# Patient Record
Sex: FEMALE | Race: BLACK OR AFRICAN AMERICAN | NOT HISPANIC OR LATINO | Employment: FULL TIME | URBAN - METROPOLITAN AREA
[De-identification: names, ages, dates, MRNs, and addresses within clinical notes are randomized per-mention and may not be internally consistent; named-entity substitution may affect disease eponyms.]

---

## 2023-03-23 PROBLEM — I65.29 CAROTID ARTERY STENOSIS: Status: ACTIVE | Noted: 2023-03-23

## 2023-03-23 PROBLEM — M25.511 RIGHT SHOULDER PAIN: Status: ACTIVE | Noted: 2023-03-23

## 2023-03-23 PROBLEM — M25.562 LEFT KNEE PAIN: Status: ACTIVE | Noted: 2023-03-23

## 2023-03-23 PROBLEM — L91.8 CUTANEOUS SKIN TAGS: Status: ACTIVE | Noted: 2023-03-23

## 2023-03-23 PROBLEM — K63.5 COLON POLYPS: Status: ACTIVE | Noted: 2023-03-23

## 2023-03-23 PROBLEM — M65.9 TENOSYNOVITIS: Status: ACTIVE | Noted: 2023-03-23

## 2023-03-23 PROBLEM — R06.00 DYSPNEA: Status: ACTIVE | Noted: 2023-03-23

## 2023-03-23 PROBLEM — M85.80 OSTEOPENIA: Status: ACTIVE | Noted: 2023-03-23

## 2023-03-23 PROBLEM — R07.9 CHEST PAIN: Status: ACTIVE | Noted: 2023-03-23

## 2023-03-23 PROBLEM — I10 HYPERTENSION: Status: ACTIVE | Noted: 2023-03-23

## 2023-03-23 PROBLEM — E66.9 OBESITY: Status: ACTIVE | Noted: 2023-03-23

## 2023-03-23 PROBLEM — R73.03 PREDIABETES: Status: ACTIVE | Noted: 2023-03-23

## 2023-03-23 PROBLEM — J44.9 COPD (CHRONIC OBSTRUCTIVE PULMONARY DISEASE) (MULTI): Status: ACTIVE | Noted: 2023-03-23

## 2023-03-23 PROBLEM — I77.1 SUBCLAVIAN ARTERY STENOSIS, LEFT (CMS-HCC): Status: ACTIVE | Noted: 2023-03-23

## 2023-03-23 PROBLEM — E78.5 DYSLIPIDEMIA: Status: ACTIVE | Noted: 2023-03-23

## 2023-03-23 PROBLEM — R05.9 COUGH: Status: ACTIVE | Noted: 2023-03-23

## 2023-03-23 PROBLEM — R19.00 ABDOMINAL MASS: Status: ACTIVE | Noted: 2023-03-23

## 2023-03-23 PROBLEM — R19.02 LEFT UPPER QUADRANT ABDOMINAL SWELLING, MASS AND LUMP: Status: ACTIVE | Noted: 2023-03-23

## 2023-03-23 PROBLEM — T16.9XXA FOREIGN BODY IN EAR: Status: ACTIVE | Noted: 2023-03-23

## 2023-03-23 PROBLEM — M65.90 TENOSYNOVITIS: Status: ACTIVE | Noted: 2023-03-23

## 2023-03-23 PROBLEM — R60.0 LOWER EXTREMITY EDEMA: Status: ACTIVE | Noted: 2023-03-23

## 2023-03-23 PROBLEM — I42.1 CARDIOMYOPATHY, HYPERTROPHIC OBSTRUCTIVE (MULTI): Status: ACTIVE | Noted: 2023-03-23

## 2023-03-23 PROBLEM — M25.522 LEFT ELBOW PAIN: Status: ACTIVE | Noted: 2023-03-23

## 2023-03-23 PROBLEM — G47.33 OBSTRUCTIVE SLEEP APNEA: Status: ACTIVE | Noted: 2023-03-23

## 2023-03-23 PROBLEM — L03.90 CELLULITIS: Status: ACTIVE | Noted: 2023-03-23
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
## 2023-03-23 PROBLEM — J40 BRONCHITIS: Status: ACTIVE | Noted: 2023-03-23

## 2023-03-23 RX ORDER — DICLOFENAC SODIUM 10 MG/G
GEL TOPICAL
COMMUNITY

## 2023-03-23 RX ORDER — PRAVASTATIN SODIUM 80 MG/1
1 TABLET ORAL DAILY
COMMUNITY
Start: 2015-03-25 | End: 2024-02-02 | Stop reason: SDUPTHER

## 2023-03-23 RX ORDER — SPIRONOLACTONE AND HYDROCHLOROTHIAZIDE 25; 25 MG/1; MG/1
1 TABLET ORAL DAILY
COMMUNITY
Start: 2016-07-25 | End: 2024-01-16

## 2023-03-23 RX ORDER — GLUCOSAM/CHONDRO/HERB 149/HYAL 750-100 MG
TABLET ORAL
COMMUNITY

## 2023-03-23 RX ORDER — COD LIVER OIL
1 OIL (ML) ORAL DAILY
COMMUNITY
Start: 2014-02-06

## 2023-03-23 RX ORDER — ALBUTEROL SULFATE 90 UG/1
1-2 AEROSOL, METERED RESPIRATORY (INHALATION) AS NEEDED
COMMUNITY
Start: 2022-08-06

## 2023-03-23 RX ORDER — ASPIRIN 81 MG/1
81 TABLET ORAL DAILY
COMMUNITY
Start: 2020-06-22 | End: 2023-11-13

## 2023-03-23 RX ORDER — CARVEDILOL 25 MG/1
1 TABLET ORAL
COMMUNITY
Start: 2015-07-13 | End: 2023-11-16

## 2023-03-28 ENCOUNTER — OFFICE VISIT (OUTPATIENT)
Dept: PRIMARY CARE | Facility: CLINIC | Age: 63
End: 2023-03-28

## 2023-03-28 VITALS
BODY MASS INDEX: 40.4 KG/M2 | WEIGHT: 200 LBS | RESPIRATION RATE: 17 BRPM | DIASTOLIC BLOOD PRESSURE: 78 MMHG | SYSTOLIC BLOOD PRESSURE: 117 MMHG | TEMPERATURE: 97.7 F | OXYGEN SATURATION: 95 %

## 2023-03-28 DIAGNOSIS — J06.9 UPPER RESPIRATORY TRACT INFECTION, UNSPECIFIED TYPE: Primary | ICD-10-CM

## 2023-03-28 PROCEDURE — 1036F TOBACCO NON-USER: CPT | Performed by: STUDENT IN AN ORGANIZED HEALTH CARE EDUCATION/TRAINING PROGRAM

## 2023-03-28 PROCEDURE — 3074F SYST BP LT 130 MM HG: CPT | Performed by: STUDENT IN AN ORGANIZED HEALTH CARE EDUCATION/TRAINING PROGRAM

## 2023-03-28 PROCEDURE — 99213 OFFICE O/P EST LOW 20 MIN: CPT | Performed by: STUDENT IN AN ORGANIZED HEALTH CARE EDUCATION/TRAINING PROGRAM

## 2023-03-28 PROCEDURE — 3078F DIAST BP <80 MM HG: CPT | Performed by: STUDENT IN AN ORGANIZED HEALTH CARE EDUCATION/TRAINING PROGRAM

## 2023-03-28 RX ORDER — AZITHROMYCIN 250 MG/1
TABLET, FILM COATED ORAL
Qty: 6 TABLET | Refills: 0 | Status: SHIPPED | OUTPATIENT
Start: 2023-03-28 | End: 2023-04-02

## 2023-03-28 RX ORDER — BENZONATATE 200 MG/1
200 CAPSULE ORAL 3 TIMES DAILY PRN
Qty: 21 CAPSULE | Refills: 0 | Status: SHIPPED | OUTPATIENT
Start: 2023-03-28 | End: 2023-04-04

## 2023-03-28 NOTE — PROGRESS NOTES
Subjective   Patient ID: Moni Riggs is a 62 y.o. female who presents for cough/congestion.     Cough/congestion  Yellow mucus  Has been taking Mucinex   Keeping her up at night  No chest pain   Some shortness of breath; does admit to some deconditioning   Granddaughter with cold few weeks ago  No fevers   Slight sinus pain/congestion on L side   Has not used inhaler recently (Albuterol)     Pharmacy: [email or web address]    Review of Systems: as above     Objective   /78 (BP Location: Right arm)   Temp 36.5 °C (97.7 °F)   Resp 17   Wt 90.7 kg (200 lb)   SpO2 95%   BMI 40.40 kg/m²     Physical Exam  General: well appearing AA female in NAD  HEENT: NCAT, MMM, Tms visible with light reflex bilaterally, pharyngeal erythema without exudates, nasal turbinates erythematous   CV: RRR  PULM: no hypoxia or labored respirations, no focal crackles or wheezing, intermittent productive coughing  ABD: obese, soft, NT, ND  EXT: WWP, no significant edema  NEURO: A&Ox4, no gross motor or sensory deficits       Assessment/Plan   Diagnoses and all orders for this visit:  Upper respiratory tract infection, unspecified type  -     azithromycin (Zithromax) 250 mg tablet; Take 2 tablets (500 mg) by mouth once daily for 1 day, THEN 1 tablet (250 mg) once daily for 4 days. Take 2 tabs (500 mg) by mouth today, than 1 daily for 4 days..  -     benzonatate (Tessalon) 200 mg capsule; Take 1 capsule (200 mg) by mouth 3 times a day as needed for cough for up to 7 days. Do not crush or chew.    Return precautions reviewed. If no improvement in next 5 days on above regimen, may consider chest x-ray and additional/alternative antibiotic treatment.     Arturo Lynch MD  Internal Medicine-Pediatrics

## 2023-08-21 ENCOUNTER — OFFICE VISIT (OUTPATIENT)
Dept: PRIMARY CARE | Facility: CLINIC | Age: 63
End: 2023-08-21
Payer: COMMERCIAL

## 2023-08-21 VITALS — SYSTOLIC BLOOD PRESSURE: 120 MMHG | DIASTOLIC BLOOD PRESSURE: 70 MMHG | OXYGEN SATURATION: 94 % | HEART RATE: 80 BPM

## 2023-08-21 DIAGNOSIS — Z00.00 ROUTINE GENERAL MEDICAL EXAMINATION AT A HEALTH CARE FACILITY: ICD-10-CM

## 2023-08-21 DIAGNOSIS — J06.9 UPPER RESPIRATORY TRACT INFECTION, UNSPECIFIED TYPE: Primary | ICD-10-CM

## 2023-08-21 LAB
ALANINE AMINOTRANSFERASE (SGPT) (U/L) IN SER/PLAS: 14 U/L (ref 7–45)
ALBUMIN (G/DL) IN SER/PLAS: 4.3 G/DL (ref 3.4–5)
ALKALINE PHOSPHATASE (U/L) IN SER/PLAS: 92 U/L (ref 33–136)
ANION GAP IN SER/PLAS: 12 MMOL/L (ref 10–20)
ASPARTATE AMINOTRANSFERASE (SGOT) (U/L) IN SER/PLAS: 12 U/L (ref 9–39)
BASOPHILS (10*3/UL) IN BLOOD BY AUTOMATED COUNT: 0.05 X10E9/L (ref 0–0.1)
BASOPHILS/100 LEUKOCYTES IN BLOOD BY AUTOMATED COUNT: 0.5 % (ref 0–2)
BILIRUBIN TOTAL (MG/DL) IN SER/PLAS: 0.3 MG/DL (ref 0–1.2)
CALCIDIOL (25 OH VITAMIN D3) (NG/ML) IN SER/PLAS: 32 NG/ML
CALCIUM (MG/DL) IN SER/PLAS: 10.9 MG/DL (ref 8.6–10.6)
CARBON DIOXIDE, TOTAL (MMOL/L) IN SER/PLAS: 27 MMOL/L (ref 21–32)
CHLORIDE (MMOL/L) IN SER/PLAS: 103 MMOL/L (ref 98–107)
CHOLESTEROL (MG/DL) IN SER/PLAS: 155 MG/DL (ref 0–199)
CHOLESTEROL IN HDL (MG/DL) IN SER/PLAS: 37.9 MG/DL
CHOLESTEROL/HDL RATIO: 4.1
CREATININE (MG/DL) IN SER/PLAS: 1.26 MG/DL (ref 0.5–1.05)
EOSINOPHILS (10*3/UL) IN BLOOD BY AUTOMATED COUNT: 0.33 X10E9/L (ref 0–0.7)
EOSINOPHILS/100 LEUKOCYTES IN BLOOD BY AUTOMATED COUNT: 3.6 % (ref 0–6)
ERYTHROCYTE DISTRIBUTION WIDTH (RATIO) BY AUTOMATED COUNT: 12.8 % (ref 11.5–14.5)
ERYTHROCYTE MEAN CORPUSCULAR HEMOGLOBIN CONCENTRATION (G/DL) BY AUTOMATED: 31.6 G/DL (ref 32–36)
ERYTHROCYTE MEAN CORPUSCULAR VOLUME (FL) BY AUTOMATED COUNT: 89 FL (ref 80–100)
ERYTHROCYTES (10*6/UL) IN BLOOD BY AUTOMATED COUNT: 5.29 X10E12/L (ref 4–5.2)
ESTIMATED AVERAGE GLUCOSE FOR HBA1C: 140 MG/DL
GFR FEMALE: 47 ML/MIN/1.73M2
GLUCOSE (MG/DL) IN SER/PLAS: 121 MG/DL (ref 74–99)
HEMATOCRIT (%) IN BLOOD BY AUTOMATED COUNT: 47.1 % (ref 36–46)
HEMOGLOBIN (G/DL) IN BLOOD: 14.9 G/DL (ref 12–16)
HEMOGLOBIN A1C/HEMOGLOBIN TOTAL IN BLOOD: 6.5 %
IMMATURE GRANULOCYTES/100 LEUKOCYTES IN BLOOD BY AUTOMATED COUNT: 2.9 % (ref 0–0.9)
LDL: 90 MG/DL (ref 0–99)
LEUKOCYTES (10*3/UL) IN BLOOD BY AUTOMATED COUNT: 9.3 X10E9/L (ref 4.4–11.3)
LYMPHOCYTES (10*3/UL) IN BLOOD BY AUTOMATED COUNT: 3.91 X10E9/L (ref 1.2–4.8)
LYMPHOCYTES/100 LEUKOCYTES IN BLOOD BY AUTOMATED COUNT: 42.1 % (ref 13–44)
MONOCYTES (10*3/UL) IN BLOOD BY AUTOMATED COUNT: 0.79 X10E9/L (ref 0.1–1)
MONOCYTES/100 LEUKOCYTES IN BLOOD BY AUTOMATED COUNT: 8.5 % (ref 2–10)
NEUTROPHILS (10*3/UL) IN BLOOD BY AUTOMATED COUNT: 3.94 X10E9/L (ref 1.2–7.7)
NEUTROPHILS/100 LEUKOCYTES IN BLOOD BY AUTOMATED COUNT: 42.4 % (ref 40–80)
NRBC (PER 100 WBCS) BY AUTOMATED COUNT: 0 /100 WBC (ref 0–0)
PLATELETS (10*3/UL) IN BLOOD AUTOMATED COUNT: 359 X10E9/L (ref 150–450)
POTASSIUM (MMOL/L) IN SER/PLAS: 4.3 MMOL/L (ref 3.5–5.3)
PROTEIN TOTAL: 7.1 G/DL (ref 6.4–8.2)
SODIUM (MMOL/L) IN SER/PLAS: 138 MMOL/L (ref 136–145)
THYROTROPIN (MIU/L) IN SER/PLAS BY DETECTION LIMIT <= 0.05 MIU/L: 1.66 MIU/L (ref 0.44–3.98)
TRIGLYCERIDE (MG/DL) IN SER/PLAS: 135 MG/DL (ref 0–149)
UREA NITROGEN (MG/DL) IN SER/PLAS: 25 MG/DL (ref 6–23)
VLDL: 27 MG/DL (ref 0–40)

## 2023-08-21 PROCEDURE — 80061 LIPID PANEL: CPT

## 2023-08-21 PROCEDURE — 85025 COMPLETE CBC W/AUTO DIFF WBC: CPT

## 2023-08-21 PROCEDURE — 3074F SYST BP LT 130 MM HG: CPT | Performed by: INTERNAL MEDICINE

## 2023-08-21 PROCEDURE — 83036 HEMOGLOBIN GLYCOSYLATED A1C: CPT

## 2023-08-21 PROCEDURE — 80053 COMPREHEN METABOLIC PANEL: CPT

## 2023-08-21 PROCEDURE — 1036F TOBACCO NON-USER: CPT | Performed by: INTERNAL MEDICINE

## 2023-08-21 PROCEDURE — 3078F DIAST BP <80 MM HG: CPT | Performed by: INTERNAL MEDICINE

## 2023-08-21 PROCEDURE — 82306 VITAMIN D 25 HYDROXY: CPT

## 2023-08-21 PROCEDURE — 84443 ASSAY THYROID STIM HORMONE: CPT

## 2023-08-21 PROCEDURE — 99213 OFFICE O/P EST LOW 20 MIN: CPT | Performed by: INTERNAL MEDICINE

## 2023-08-21 RX ORDER — AZITHROMYCIN 250 MG/1
TABLET, FILM COATED ORAL
Qty: 6 TABLET | Refills: 0 | Status: SHIPPED | OUTPATIENT
Start: 2023-08-21 | End: 2023-08-26

## 2023-08-21 NOTE — PROGRESS NOTES
"I reviewed with the resident the medical history and the resident’s findings on physical examination.  I discussed with the resident the patient’s diagnosis and concur with the treatment plan as documented in the resident note.           Moni Riggs is a 63 yo F presenting in FU     1. L knee pain     2. R shoulder pain     3. T2DM 6.3 in 6.22    4. HTN, DLD, HOCM, ?HFpEF, LAMBERTO on PAP -  in 4.23  -spiron-hctz  -prava 40   -coreg 25 bid   -asa 81   -FU cardiology   -PAP     #HM   -due labs   -mammo 4.23   -cscope 4.22  -tdap 2014   -?shingrix      59\"   202 lbs in 2.23            Ella Donovan MD      "

## 2023-08-21 NOTE — PROGRESS NOTES
Ms. Riggs is  a 63 YO F here after she was diagnosed with COVID on 8/11 for mild resp sx. She was seen in the ED and was discharged with Paxlovid, which she did not take. She has hx of asthma so was also given albuterol. Was also given prednisone and Mucinex. Medications helped relief symptoms.     Although she feels better, and only has mild phlegm and airway irritation, she is concerned about developing pneumonia.     ROS reviewed and negative. Leg swelling from prior visit is improved.     PE:  Vitals:    08/21/23 0839   BP: 120/70   Pulse: 80   SpO2: 94%      /70   Pulse 80   SpO2 94%        63 y/o F with obesity, HTN, DLD, DM2, HFpEF, carotid stenosis, LAMBERTO on CPAP presenting to clinic for persistent sx after COVID infection.     #COVID  -Mild sx  -Took Prednisone albuterol mucinex  -Did not take paxlovid  -Z-pack        Physical Exam  Constitutional:       General: She is not in acute distress.     Appearance: Normal appearance.   HENT:      Head: Normocephalic and atraumatic.      Right Ear: External ear normal.      Left Ear: External ear normal.      Mouth/Throat:      Mouth: Mucous membranes are moist.      Pharynx: Oropharynx is clear.   Eyes:      Extraocular Movements: Extraocular movements intact.      Conjunctiva/sclera: Conjunctivae normal.      Pupils: Pupils are equal, round, and reactive to light.   Cardiovascular:      Rate and Rhythm: Normal rate and regular rhythm.      Heart sounds: No murmur heard.     No gallop.   Pulmonary:      Effort: Pulmonary effort is normal. No respiratory distress.      Breath sounds: Normal breath sounds. No stridor.   Abdominal:      General: Abdomen is flat. Bowel sounds are normal.      Palpations: Abdomen is soft. There is no mass.      Tenderness: There is no abdominal tenderness. There is no guarding.   Musculoskeletal:      Cervical back: Neck supple.   Skin:     General: Skin is warm.      Coloration: Skin is not jaundiced.      Findings: No  bruising, erythema or rash.   Neurological:      General: No focal deficit present.      Mental Status: She is alert and oriented to person, place, and time.   Psychiatric:         Mood and Affect: Mood normal.         Thought Content: Thought content normal.

## 2024-01-31 RX ORDER — PREDNISONE 20 MG/1
TABLET ORAL
COMMUNITY
Start: 2023-08-11 | End: 2024-02-02 | Stop reason: ALTCHOICE

## 2024-01-31 RX ORDER — NIRMATRELVIR AND RITONAVIR 300-100 MG
KIT ORAL
COMMUNITY
Start: 2023-08-11 | End: 2024-02-02 | Stop reason: WASHOUT

## 2024-01-31 RX ORDER — BENZONATATE 100 MG/1
CAPSULE ORAL
COMMUNITY
Start: 2023-08-11

## 2024-02-02 ENCOUNTER — OFFICE VISIT (OUTPATIENT)
Dept: CARDIOLOGY | Facility: CLINIC | Age: 64
End: 2024-02-02
Payer: COMMERCIAL

## 2024-02-02 VITALS
DIASTOLIC BLOOD PRESSURE: 66 MMHG | OXYGEN SATURATION: 95 % | HEART RATE: 72 BPM | SYSTOLIC BLOOD PRESSURE: 114 MMHG | WEIGHT: 197.1 LBS | BODY MASS INDEX: 39.73 KG/M2 | HEIGHT: 59 IN

## 2024-02-02 DIAGNOSIS — E78.5 DYSLIPIDEMIA: ICD-10-CM

## 2024-02-02 DIAGNOSIS — I65.29 STENOSIS OF CAROTID ARTERY, UNSPECIFIED LATERALITY: ICD-10-CM

## 2024-02-02 DIAGNOSIS — I25.10 CORONARY ARTERY CALCIFICATION: Primary | ICD-10-CM

## 2024-02-02 DIAGNOSIS — I25.84 CORONARY ARTERY CALCIFICATION: Primary | ICD-10-CM

## 2024-02-02 DIAGNOSIS — I10 HYPERTENSION, UNSPECIFIED TYPE: ICD-10-CM

## 2024-02-02 PROCEDURE — 1036F TOBACCO NON-USER: CPT | Performed by: INTERNAL MEDICINE

## 2024-02-02 PROCEDURE — 3078F DIAST BP <80 MM HG: CPT | Performed by: INTERNAL MEDICINE

## 2024-02-02 PROCEDURE — 1159F MED LIST DOCD IN RCRD: CPT | Performed by: INTERNAL MEDICINE

## 2024-02-02 PROCEDURE — 1126F AMNT PAIN NOTED NONE PRSNT: CPT | Performed by: INTERNAL MEDICINE

## 2024-02-02 PROCEDURE — 3074F SYST BP LT 130 MM HG: CPT | Performed by: INTERNAL MEDICINE

## 2024-02-02 PROCEDURE — 99214 OFFICE O/P EST MOD 30 MIN: CPT | Performed by: INTERNAL MEDICINE

## 2024-02-02 RX ORDER — SPIRONOLACTONE AND HYDROCHLOROTHIAZIDE 25; 25 MG/1; MG/1
1 TABLET ORAL DAILY
Qty: 90 TABLET | Refills: 3 | Status: SHIPPED | OUTPATIENT
Start: 2024-02-02

## 2024-02-02 RX ORDER — CARVEDILOL 25 MG/1
25 TABLET ORAL
Qty: 180 TABLET | Refills: 3 | Status: SHIPPED | OUTPATIENT
Start: 2024-02-02

## 2024-02-02 RX ORDER — PRAVASTATIN SODIUM 80 MG/1
80 TABLET ORAL DAILY
Qty: 90 TABLET | Refills: 3 | Status: SHIPPED | OUTPATIENT
Start: 2024-02-02 | End: 2024-02-07

## 2024-02-02 RX ORDER — ASPIRIN 81 MG/1
81 TABLET ORAL DAILY
Qty: 90 TABLET | Refills: 3 | Status: SHIPPED | OUTPATIENT
Start: 2024-02-02

## 2024-02-02 ASSESSMENT — ENCOUNTER SYMPTOMS
LOSS OF SENSATION IN FEET: 0
FALLS: 0
ALTERED MENTAL STATUS: 0
HEMATURIA: 0
WHEEZING: 0
NAUSEA: 0
CONSTIPATION: 0
DEPRESSION: 0
DIARRHEA: 0
MEMORY LOSS: 0
VOMITING: 0
COUGH: 0
HEMOPTYSIS: 0
FEVER: 0
ABDOMINAL PAIN: 0
HEADACHES: 0
MYALGIAS: 0
CHILLS: 0
DYSURIA: 0
BLOATING: 0
OCCASIONAL FEELINGS OF UNSTEADINESS: 0

## 2024-02-02 ASSESSMENT — PAIN SCALES - GENERAL: PAINLEVEL: 0-NO PAIN

## 2024-02-02 NOTE — PROGRESS NOTES
Chief complaint:  FU     HPI  64 yo BF w/ h/o ?HFpEF, CAC, carotid stenosis, HTN, HPL, LAMBERTO (intol CPAP), TOB (quit) now here for cardiology f/u.   No chest pain. No dyspnea at rest. +occ BRANTLEY (mod exertion). No orthopnea/PND.. No palps. No LH/dizziness/syncope. +occ LE edema, less on diuretic. No claudication. No cough. No further L pinky numb.  BP at home: 120s/70s  ECG 11/17: SR (79), 1st deg AVB, antlat TWIs  ECG 5/19: SR (74), PACs, 1st deg AVB, ?SMI, lat TWIs  ECG 2/23: SR (79), PACs, 1st deg AVB, lat TWIs  Echo 5/11: EF >65%, DD, THIERRY w/ mild LVOT obstruction  Echo 7/16: EF 70-75%, conc remod, pseudonl/DD, nl LAD, pASP 40  ETT 8/16: no ischemia  CCS 4/23: 215 (LM 0, , Lcx 22, RCA 60), nl heart size, no peric eff, AsAo 2.5cm, 6mm LESA nodule  CT ab 4/20: no AAA  Carotid US 9/08: B plaque, EDITH 40-59%, LICA <40%  Carotid US 9/09: B plaque, EDITH 60-79%, LICA 40-59%     Review of Systems   Constitutional: Negative for chills, fever and malaise/fatigue.   HENT:  Negative for hearing loss.    Eyes:  Negative for visual disturbance.   Respiratory:  Negative for cough, hemoptysis and wheezing.    Skin:  Negative for rash.   Musculoskeletal:  Negative for falls and myalgias.   Gastrointestinal:  Negative for bloating, abdominal pain, constipation, diarrhea, dysphagia, nausea and vomiting.   Genitourinary:  Negative for dysuria and hematuria.   Neurological:  Negative for headaches.   Psychiatric/Behavioral:  Negative for altered mental status, depression and memory loss.         Social History     Tobacco Use    Smoking status: Never    Smokeless tobacco: Never   Substance Use Topics    Alcohol use: Not on file        Family History   Problem Relation Name Age of Onset    Other (Cardiac disorder) Mother      COPD Mother      Multiple myeloma Father      Hypertension Brother      Heart attack Mother's Sister      Hypertension Father's Sister      Hypertension Father's Brother      Heart attack Maternal Grandmother       Diabetes Other Uncle     Lung cancer Other Grandmother     Stroke Other Aunt         No Known Allergies     Current Outpatient Medications   Medication Instructions    albuterol 90 mcg/actuation inhaler 1-2 puffs, inhalation, As needed    aspirin 81 mg, oral, Daily, Take with food.    benzonatate (Tessalon) 100 mg capsule TAKE 1 CAPSULE BY MOUTH THREE TIMES DAILY FOR 10 DAYS    carvedilol (COREG) 25 mg, oral, 2 times daily with meals    cod liver oiL oil 1 capsule, oral, Daily    diclofenac sodium (Voltaren) 1 % gel gel Apply 4 grams twice a day to affected area as needed    omega 3-dha-epa-fish oil (Fish OiL) 1,000 mg (120 mg-180 mg) capsule Take as directed    pravastatin (Pravachol) 80 mg tablet 1 tablet, oral, Daily    spironolacton-hydrochlorothiaz (Aldactazide) 25-25 mg tablet 25 mg, oral, Daily        Vitals:    02/02/24 1041   BP: 114/66   Pulse: 72   SpO2: 95%        Physical Exam  Constitutional:       Appearance: Normal appearance.   HENT:      Head: Normocephalic and atraumatic.      Nose: Nose normal.   Neck:      Vascular: Carotid bruit present.   Cardiovascular:      Rate and Rhythm: Normal rate and regular rhythm.      Heart sounds: Murmur heard.      Systolic murmur is present with a grade of 1/6.      Comments: Trivial LE edema  Pulmonary:      Effort: Pulmonary effort is normal.      Breath sounds: Normal breath sounds.   Abdominal:      Palpations: Abdomen is soft.      Tenderness: There is no abdominal tenderness.   Musculoskeletal:      Right lower leg: Edema present.      Left lower leg: Edema present.   Skin:     General: Skin is warm and dry.   Neurological:      General: No focal deficit present.      Mental Status: She is alert.   Psychiatric:         Mood and Affect: Mood normal.         Judgment: Judgment normal.        Results/Data  8/23 Cr 1.26, K 4.3, LDL 90, HGB 14.9, TSH 1.66  6/22 Cr 1.06, K 4.1, hgba1c 6.3  11/21 Cr 0.99, K 4.7, LFT nl, LDL 84, HDL 30, , Chol 146, HGB  13.8, , hgba1c 6.9, TSH 1.38  4/20 Cr 0.72, K 4.5  9/19 hgba1c 6.1  5/19 Cr 0.94, K 4.4, BNP 22  9/18 Cr 0.7, K 3.8, LFT nl, LDL 78, HDL 31, , Chol 137, HGB 13.7, , hgba1c 6.3, TSH 1.7  7/16 BNP 4     Assessment/Plan   62 yo BF w/ h/o ?HFpEF, CAC, carotid stenosis, HTN, HPL, LAMBERTO (intol CPAP), TOB (quit). Doing well. Due to h/o JAMES, get updated carotid US. Due to nodule on CT cardiac score, get CT chest.  -continue ASA 81 qd (JAMES)  -continue Carvedilol 25 bid  -continue [place]-HCTZ 25-25 qd (can increase if needed for edema/HTN)  -continue Prava 80 qhs (she defers change to other statin), consider add Zetia if patient agreeable -> goal LDL at least <100, optimal <70 (JAMES)  -f/u 1 year (earlier if needed)     Jaleel Mayfield MD

## 2024-02-06 DIAGNOSIS — E78.5 DYSLIPIDEMIA: ICD-10-CM

## 2024-02-07 RX ORDER — PRAVASTATIN SODIUM 80 MG/1
80 TABLET ORAL DAILY
Qty: 90 TABLET | Refills: 3 | Status: SHIPPED | OUTPATIENT
Start: 2024-02-07

## 2024-02-16 ENCOUNTER — HOSPITAL ENCOUNTER (OUTPATIENT)
Dept: VASCULAR MEDICINE | Facility: CLINIC | Age: 64
Discharge: HOME | End: 2024-02-16
Payer: COMMERCIAL

## 2024-02-16 DIAGNOSIS — I65.23 OCCLUSION AND STENOSIS OF BILATERAL CAROTID ARTERIES: ICD-10-CM

## 2024-02-16 DIAGNOSIS — I65.29 STENOSIS OF CAROTID ARTERY, UNSPECIFIED LATERALITY: ICD-10-CM

## 2024-02-16 PROCEDURE — 93880 EXTRACRANIAL BILAT STUDY: CPT

## 2024-02-16 PROCEDURE — 93880 EXTRACRANIAL BILAT STUDY: CPT | Performed by: INTERNAL MEDICINE

## 2024-02-16 NOTE — TELEPHONE ENCOUNTER
----- Message from Jaleel Mayfield MD sent at 2/16/2024  3:23 PM EST -----  Notify pt carotid US looks similar to prior ones.

## 2024-02-19 NOTE — TELEPHONE ENCOUNTER
Result Communication    Resulted Orders   Vascular US carotid artery duplex bilateral    Narrative                 [facility]  [address] [address]   Tel [phone] and Fax [phone]       Vascular Lab Report  [facility] US CAROTID ARTERY DUPLEX BILATERAL       Patient Name:      SHEILA ANTONIO Reading Physician:  [id number] Darnell Dolan MD, RPVI  Study Date:        2/16/2024            Ordering Physician: [id number] FABIOLA MARIE  MRN/PID:           [id number]             Technologist:       Sulema Treviño T  Accession#:        [id number]         Technologist 2:  Date of Birth/Age: 1960 / 63 years Encounter#:         [id number]  Gender:            F  Admission Status:  Outpatient           Location Performed: [facility]       Diagnosis/ICD: Occlusion and stenosis of bilateral carotid arteries-I65.23  Indication:    Occlusion/stenosis, Carotid without cerebral infarction  CPT Codes:     02487 Cerebrovascular Carotid Duplex scan complete       CONCLUSIONS:  Right Carotid: Findings are consistent with less than 50% stenosis of the right proximal internal carotid artery. Laminar flow seen by color Doppler. Right external carotid artery appears patent with no evidence of stenosis. The right vertebral artery is patent with antegrade flow. No evidence of hemodynamically significant stenosis in the right subclavian artery.  Left Carotid: Findings are consistent with 50 to 69% stenosis of the left proximal internal carotid artery. Turbulent flow seen by color Doppler. Left external carotid artery appears patent with no evidence of stenosis. The left vertebral artery is patent with antegrade flow. There are elevated velocities in the left subclavian artery that are suggestive of disease.     Additional Findings:  Technically challenging due to vessel depth and body habitus.       Imaging & Doppler Findings:  Right  Plaque Morph: The proximal right internal carotid artery demonstrates calcified, irregular and heterogenous plaque. The right carotid bulb demonstrates heterogenous and smooth plaque.  Left Plaque Morph: The proximal left internal carotid artery demonstrates calcified, irregular and heterogenous plaque. The distal left common carotid artery demonstrates smooth and heterogenous plaque. The left carotid bulb demonstrates heterogenous, irregular and calcified plaque.      Right                        Left    PSV      EDV                PSV      EDV  96 cm/s            CCA P    127 cm/s  97 cm/s            CCA D    99 cm/s  78 cm/s  19 cm/s   ICA P    195 cm/s 52 cm/s  116 cm/s 38 cm/s   ICA M    135 cm/s 30 cm/s  63 cm/s  21 cm/s   ICA D    74 cm/s  19 cm/s  139 cm/s            ECA     129 cm/s  78 cm/s  20 cm/s Vertebral  69 cm/s  17 cm/s  176 cm/s         Subclavian 241 cm/s                  Right Left  ICA/CCA Ratio  0.8  2.0          [id number] Darnell Dolan MD, TATA  Electronically signed by [id number] Darnell Dolan MD, TATA on 2/16/2024 at 2:35:51 PM         ** Final **         9:35 AM      Results were successfully communicated with the patient and they acknowledged their understanding.

## 2024-02-21 ENCOUNTER — OFFICE VISIT (OUTPATIENT)
Dept: PRIMARY CARE | Facility: CLINIC | Age: 64
End: 2024-02-21
Payer: COMMERCIAL

## 2024-02-21 VITALS
DIASTOLIC BLOOD PRESSURE: 65 MMHG | RESPIRATION RATE: 16 BRPM | SYSTOLIC BLOOD PRESSURE: 145 MMHG | OXYGEN SATURATION: 93 % | HEART RATE: 120 BPM

## 2024-02-21 DIAGNOSIS — J45.41 MODERATE PERSISTENT ASTHMATIC BRONCHITIS WITH ACUTE EXACERBATION (HHS-HCC): ICD-10-CM

## 2024-02-21 DIAGNOSIS — I10 PRIMARY HYPERTENSION: Primary | ICD-10-CM

## 2024-02-21 PROCEDURE — 3077F SYST BP >= 140 MM HG: CPT | Performed by: INTERNAL MEDICINE

## 2024-02-21 PROCEDURE — 1159F MED LIST DOCD IN RCRD: CPT | Performed by: INTERNAL MEDICINE

## 2024-02-21 PROCEDURE — 99213 OFFICE O/P EST LOW 20 MIN: CPT | Performed by: INTERNAL MEDICINE

## 2024-02-21 PROCEDURE — 1036F TOBACCO NON-USER: CPT | Performed by: INTERNAL MEDICINE

## 2024-02-21 PROCEDURE — 3078F DIAST BP <80 MM HG: CPT | Performed by: INTERNAL MEDICINE

## 2024-02-21 PROCEDURE — 1126F AMNT PAIN NOTED NONE PRSNT: CPT | Performed by: INTERNAL MEDICINE

## 2024-02-21 NOTE — PROGRESS NOTES
Subjective   Patient ID: Moni Riggs is a 63 y.o. female who presents for No chief complaint on file..  Sick visit no chest pain but short of breath for several days with increased work of breathing some cough sinus has been okay has been taking her medication   HPI breathing is she has been like this.  2 days some cough some fever some chills used to have inhaler at home and does not have any breathing treatments no ill exposures that she knows of has been taking her regular medications bowels normal no dysuria overall weak    Review of Systems    Objective   There were no vitals taken for this visit.    Physical Exam vital signs noted alert and oriented x 3 NCAT no coryza nares clear discharge OP benign no AC nodes no JVD chest clear to auscultation with bronchial breath sounds light wheeze but moving little air CV regular rate and rhythm S1-S2 with tachycardia extremities no clubbing cyanosis or edema normal distal pulses    Assessment/Plan impression acute upper respiratory tract symptoms asthma versus bronchitis versus pneumonia hypertension  Plan will contact 911 to go to [facility] for evaluation chest x-ray oxygen and breathing treatments and blood work or as is seen A-fib at that point after evaluation and/or admission may follow-up for continuing care reviewed prior follow-up after that

## 2024-02-26 ENCOUNTER — DOCUMENTATION (OUTPATIENT)
Dept: PRIMARY CARE | Facility: CLINIC | Age: 64
End: 2024-02-26
Payer: COMMERCIAL

## 2024-02-27 ENCOUNTER — PATIENT OUTREACH (OUTPATIENT)
Dept: PRIMARY CARE | Facility: CLINIC | Age: 64
End: 2024-02-27
Payer: COMMERCIAL

## 2024-02-27 DIAGNOSIS — J10.1 INFLUENZA A: ICD-10-CM

## 2024-02-27 DIAGNOSIS — Z09 HOSPITAL DISCHARGE FOLLOW-UP: ICD-10-CM

## 2024-02-27 NOTE — PROGRESS NOTES
TCM complete- Voicemail box full x2 attempts  Discharge date 2/23/24   2 attempts were made to reach patient to assess needs.   No return call as of this note.   If patient schedules follow up within 14 days of discharge, visit is TCM billable.  Message sent to practice clinical pool to reach out to patient and schedule an appointment within 7-13 days from discharge date. Needs seen by 3/12/24.     If patient meets criteria for moderately complex & has follow-up within 14 days-can bill [id number].   If patient meets criteria for highly complex & has follow-up visit within 7 days-can bill [id number].    *virtual follow up needs modifier added (95 or GT)   *AWV AND TCM CAN BE BILLED TOGETHER WITH 25 MODIFIER     [facility]  2/21/24- 2/23/24  Diagnosis: Influenza A,  pneumonia (Primary Dx);   SOB (shortness of breath);   Chronic obstructive pulmonary disease with acute exacerbation

## 2024-03-13 ENCOUNTER — PATIENT OUTREACH (OUTPATIENT)
Dept: PRIMARY CARE | Facility: CLINIC | Age: 64
End: 2024-03-13
Payer: COMMERCIAL

## 2024-03-13 DIAGNOSIS — Z09 HOSPITAL DISCHARGE FOLLOW-UP: ICD-10-CM

## 2024-03-13 NOTE — PROGRESS NOTES
Unable to reach patient for call back 14 days post discharge from the hospital. If no voicemail available call attempts x 2 were made to contact the patient to assist with any questions or concerns patient may have. Patient did not follow up with their PCP within that time. Patient dis-enrolled from [facility] this date.

## 2024-03-14 ENCOUNTER — OFFICE VISIT (OUTPATIENT)
Dept: PRIMARY CARE | Facility: CLINIC | Age: 64
End: 2024-03-14
Payer: COMMERCIAL

## 2024-03-14 VITALS
WEIGHT: 194 LBS | DIASTOLIC BLOOD PRESSURE: 75 MMHG | OXYGEN SATURATION: 98 % | HEART RATE: 68 BPM | RESPIRATION RATE: 12 BRPM | BODY MASS INDEX: 39.18 KG/M2 | SYSTOLIC BLOOD PRESSURE: 128 MMHG

## 2024-03-14 DIAGNOSIS — I42.1 CARDIOMYOPATHY, HYPERTROPHIC OBSTRUCTIVE (MULTI): ICD-10-CM

## 2024-03-14 DIAGNOSIS — I10 PRIMARY HYPERTENSION: ICD-10-CM

## 2024-03-14 DIAGNOSIS — R91.1 LUNG NODULE: Primary | ICD-10-CM

## 2024-03-14 DIAGNOSIS — R73.02 GLUCOSE INTOLERANCE (IMPAIRED GLUCOSE TOLERANCE): ICD-10-CM

## 2024-03-14 DIAGNOSIS — J44.9 CHRONIC OBSTRUCTIVE PULMONARY DISEASE, UNSPECIFIED COPD TYPE (MULTI): ICD-10-CM

## 2024-03-14 PROCEDURE — 3074F SYST BP LT 130 MM HG: CPT | Performed by: INTERNAL MEDICINE

## 2024-03-14 PROCEDURE — 3078F DIAST BP <80 MM HG: CPT | Performed by: INTERNAL MEDICINE

## 2024-03-14 PROCEDURE — 1036F TOBACCO NON-USER: CPT | Performed by: INTERNAL MEDICINE

## 2024-03-14 PROCEDURE — 99214 OFFICE O/P EST MOD 30 MIN: CPT | Performed by: INTERNAL MEDICINE

## 2024-03-14 PROCEDURE — 1159F MED LIST DOCD IN RCRD: CPT | Performed by: INTERNAL MEDICINE

## 2024-03-14 NOTE — PROGRESS NOTES
Subjective   Patient ID: Moni Riggs is a 63 y.o. female who presents for No chief complaint on file..    HPI status post recent hospitalization heart rate and heart rhythm and breathing all improved she still takes some Mucinex on her CT scan she was found to have a lung nodule that requested short-term follow-up bowels normal no dysuria    Review of Systems    Objective   There were no vitals taken for this visit.  Vital signs noted alert and oriented x 3 NCAT no coryza nares without discharge OP benign normal voice no cough no JVD chest clear to auscultation no wheezing CV regular rate and rhythm S1-S2 without murmur gallop or rub occasional skip extremities no clubbing cyanosis or edema normal distal pulses  Physical Exam    Assessment/Plan     Impression status post pneumonitis influenza heart diagnosis/CM copd htn lung nodule glucose calcium  Plan continue with inhaled medication okay for chest CT without contrast requisition made and advised on follow-up after that for her previous COPD flu pneumonitis and pulmonary nodule found to continue with current respiratory medications good diet regular exercise increase water consumption for her blood pressure and heart continue with statin blood sugars noted elevated, calcium was slightly higher (increase fluids) she hopes to lose additional weight check on scans and repeat blood work in approximately one month and follow-up recheck with Dr. Veras  tt40 cc66

## 2024-04-23 ENCOUNTER — HOSPITAL ENCOUNTER (OUTPATIENT)
Dept: RADIOLOGY | Facility: CLINIC | Age: 64
Discharge: HOME | End: 2024-04-23
Payer: COMMERCIAL

## 2024-04-23 DIAGNOSIS — R91.1 LUNG NODULE: ICD-10-CM

## 2024-04-23 PROCEDURE — 71250 CT THORAX DX C-: CPT | Performed by: RADIOLOGY

## 2024-04-23 PROCEDURE — 71250 CT THORAX DX C-: CPT

## 2024-04-25 ENCOUNTER — TELEPHONE (OUTPATIENT)
Dept: PRIMARY CARE | Facility: CLINIC | Age: 64
End: 2024-04-25

## 2024-05-17 ENCOUNTER — HOSPITAL ENCOUNTER (OUTPATIENT)
Dept: RADIOLOGY | Facility: CLINIC | Age: 64
Discharge: HOME | End: 2024-05-17
Payer: COMMERCIAL

## 2024-05-17 VITALS — BODY MASS INDEX: 39.11 KG/M2 | HEIGHT: 59 IN | WEIGHT: 194 LBS

## 2024-05-17 DIAGNOSIS — Z12.31 ENCOUNTER FOR SCREENING MAMMOGRAM FOR MALIGNANT NEOPLASM OF BREAST: ICD-10-CM

## 2024-05-17 PROCEDURE — 77067 SCR MAMMO BI INCL CAD: CPT | Performed by: RADIOLOGY

## 2024-05-17 PROCEDURE — 77067 SCR MAMMO BI INCL CAD: CPT

## 2024-05-17 PROCEDURE — 77063 BREAST TOMOSYNTHESIS BI: CPT | Performed by: RADIOLOGY

## 2024-05-18 DIAGNOSIS — R92.8 ABNORMAL MAMMOGRAM: Primary | ICD-10-CM

## 2024-05-21 ENCOUNTER — HOSPITAL ENCOUNTER (OUTPATIENT)
Dept: RADIOLOGY | Facility: CLINIC | Age: 64
Discharge: HOME | End: 2024-05-21
Payer: COMMERCIAL

## 2024-05-21 VITALS — HEIGHT: 55 IN | BODY MASS INDEX: 44.9 KG/M2 | WEIGHT: 194 LBS

## 2024-05-21 DIAGNOSIS — R92.8 ABNORMAL MAMMOGRAM: ICD-10-CM

## 2024-05-21 PROCEDURE — 76982 USE 1ST TARGET LESION: CPT | Mod: LT

## 2024-05-21 PROCEDURE — 77061 BREAST TOMOSYNTHESIS UNI: CPT | Mod: LT

## 2024-05-21 PROCEDURE — 76642 ULTRASOUND BREAST LIMITED: CPT | Mod: LEFT SIDE | Performed by: STUDENT IN AN ORGANIZED HEALTH CARE EDUCATION/TRAINING PROGRAM

## 2024-05-21 PROCEDURE — 76642 ULTRASOUND BREAST LIMITED: CPT | Mod: LT

## 2024-05-21 PROCEDURE — G0279 TOMOSYNTHESIS, MAMMO: HCPCS | Mod: LEFT SIDE | Performed by: STUDENT IN AN ORGANIZED HEALTH CARE EDUCATION/TRAINING PROGRAM

## 2024-05-21 PROCEDURE — 77065 DX MAMMO INCL CAD UNI: CPT | Mod: LEFT SIDE | Performed by: STUDENT IN AN ORGANIZED HEALTH CARE EDUCATION/TRAINING PROGRAM

## 2024-05-30 ENCOUNTER — LAB (OUTPATIENT)
Dept: LAB | Facility: LAB | Age: 64
End: 2024-05-30
Payer: COMMERCIAL

## 2024-05-30 ENCOUNTER — OFFICE VISIT (OUTPATIENT)
Dept: PRIMARY CARE | Facility: CLINIC | Age: 64
End: 2024-05-30
Payer: COMMERCIAL

## 2024-05-30 VITALS — DIASTOLIC BLOOD PRESSURE: 77 MMHG | SYSTOLIC BLOOD PRESSURE: 128 MMHG | WEIGHT: 201 LBS | BODY MASS INDEX: 46.52 KG/M2

## 2024-05-30 DIAGNOSIS — R73.02 GLUCOSE INTOLERANCE (IMPAIRED GLUCOSE TOLERANCE): ICD-10-CM

## 2024-05-30 DIAGNOSIS — E78.2 MIXED HYPERLIPIDEMIA: ICD-10-CM

## 2024-05-30 DIAGNOSIS — I10 PRIMARY HYPERTENSION: ICD-10-CM

## 2024-05-30 DIAGNOSIS — Z00.00 HEALTH CARE MAINTENANCE: Primary | ICD-10-CM

## 2024-05-30 LAB
ALBUMIN SERPL BCP-MCNC: 4.2 G/DL (ref 3.4–5)
ALP SERPL-CCNC: 92 U/L (ref 33–136)
ALT SERPL W P-5'-P-CCNC: 9 U/L (ref 7–45)
ANION GAP SERPL CALC-SCNC: 11 MMOL/L (ref 10–20)
AST SERPL W P-5'-P-CCNC: 10 U/L (ref 9–39)
BILIRUB SERPL-MCNC: 0.4 MG/DL (ref 0–1.2)
BUN SERPL-MCNC: 15 MG/DL (ref 6–23)
CALCIUM SERPL-MCNC: 10.6 MG/DL (ref 8.6–10.6)
CHLORIDE SERPL-SCNC: 104 MMOL/L (ref 98–107)
CHOLEST SERPL-MCNC: 147 MG/DL (ref 0–199)
CHOLESTEROL/HDL RATIO: 4.3
CO2 SERPL-SCNC: 28 MMOL/L (ref 21–32)
CREAT SERPL-MCNC: 1.02 MG/DL (ref 0.5–1.05)
EGFRCR SERPLBLD CKD-EPI 2021: 61 ML/MIN/1.73M*2
EST. AVERAGE GLUCOSE BLD GHB EST-MCNC: 137 MG/DL
GLUCOSE SERPL-MCNC: 135 MG/DL (ref 74–99)
HBA1C MFR BLD: 6.4 %
HDLC SERPL-MCNC: 34.2 MG/DL
LDLC SERPL CALC-MCNC: 90 MG/DL
NON HDL CHOLESTEROL: 113 MG/DL (ref 0–149)
POTASSIUM SERPL-SCNC: 4.6 MMOL/L (ref 3.5–5.3)
PROT SERPL-MCNC: 6.9 G/DL (ref 6.4–8.2)
SODIUM SERPL-SCNC: 138 MMOL/L (ref 136–145)
TRIGL SERPL-MCNC: 116 MG/DL (ref 0–149)
VLDL: 23 MG/DL (ref 0–40)

## 2024-05-30 PROCEDURE — 3078F DIAST BP <80 MM HG: CPT | Performed by: INTERNAL MEDICINE

## 2024-05-30 PROCEDURE — 80053 COMPREHEN METABOLIC PANEL: CPT

## 2024-05-30 PROCEDURE — 36415 COLL VENOUS BLD VENIPUNCTURE: CPT

## 2024-05-30 PROCEDURE — 99213 OFFICE O/P EST LOW 20 MIN: CPT | Performed by: INTERNAL MEDICINE

## 2024-05-30 PROCEDURE — 3074F SYST BP LT 130 MM HG: CPT | Performed by: INTERNAL MEDICINE

## 2024-05-30 PROCEDURE — 80061 LIPID PANEL: CPT

## 2024-05-30 PROCEDURE — 83036 HEMOGLOBIN GLYCOSYLATED A1C: CPT

## 2024-05-30 NOTE — PROGRESS NOTES
Subjective   Patient ID: Moni Riggs is a 63 y.o. female who presents for No chief complaint on file..    HPI Follow-up visit no chest pain no shortness of breath no polyuria polydipsia any increased frequency in urination  She feels is due to the spironolactone lactone she is still drinking diet soda but down from regular soda bowels normal no dysuria bones muscles joints okay    Family history maternal aunt and cousins with diabetes Father hypertension          vital signs noted alert and oriented x 3 NCAT no JVD or bruit chest clear to auscultation CV regular rate and rhythm S1-S2 without murmur gallop or rub extremities no clubbing cyanosis or edema normal distal pulses    Review of Systems    Objective   There were no vitals taken for this visit.    Physical Exam    Assessment/Plan impression glucose intolerance versus diabetes mellitus hypertension hyperlipidemia cardiomyopathy  Plan review prior visit  Check Chem-7 advised on glucose potassium and kidney function including calcium her mammogram and lung CT were reviewed and follow-up regarding the lung CT check hepatic panel advised on liver enzymes check lipid panel advised on cholesterol profile check glycohemoglobin advised on long-term blood sugar test diet weight loss exercise good water consumption continue with current medications and recheck based on above 3 months    review labs and films (check)

## 2024-11-13 ENCOUNTER — APPOINTMENT (OUTPATIENT)
Dept: PRIMARY CARE | Facility: CLINIC | Age: 64
End: 2024-11-13
Payer: COMMERCIAL

## 2024-11-13 VITALS
SYSTOLIC BLOOD PRESSURE: 125 MMHG | WEIGHT: 202 LBS | DIASTOLIC BLOOD PRESSURE: 67 MMHG | HEART RATE: 84 BPM | BODY MASS INDEX: 46.75 KG/M2 | OXYGEN SATURATION: 94 %

## 2024-11-13 DIAGNOSIS — I10 PRIMARY HYPERTENSION: ICD-10-CM

## 2024-11-13 DIAGNOSIS — Z00.00 HEALTH CARE MAINTENANCE: Primary | ICD-10-CM

## 2024-11-13 DIAGNOSIS — R73.02 GLUCOSE INTOLERANCE (IMPAIRED GLUCOSE TOLERANCE): ICD-10-CM

## 2024-11-13 DIAGNOSIS — R91.1 LUNG NODULE: ICD-10-CM

## 2024-11-13 DIAGNOSIS — M75.51 BURSITIS OF RIGHT SHOULDER: ICD-10-CM

## 2024-11-13 PROCEDURE — 3074F SYST BP LT 130 MM HG: CPT | Performed by: INTERNAL MEDICINE

## 2024-11-13 PROCEDURE — 3078F DIAST BP <80 MM HG: CPT | Performed by: INTERNAL MEDICINE

## 2024-11-13 PROCEDURE — 99214 OFFICE O/P EST MOD 30 MIN: CPT | Performed by: INTERNAL MEDICINE

## 2024-11-13 RX ORDER — METHYLPREDNISOLONE 4 MG/1
TABLET ORAL
Qty: 21 TABLET | Refills: 0 | Status: SHIPPED | OUTPATIENT
Start: 2024-11-13 | End: 2024-11-20

## 2024-11-13 NOTE — PROGRESS NOTES
Subjective   Patient ID: Moni Riggs is a 64 y.o. female who presents for Arm Pain, Foot Pain, and Nasal Congestion.    HPI follow-up visit no chest pain no shortness of breath complains of right heel pain and right shoulder pain sometimes neck pain no polyuria polydipsia no side effect with medication some prior results reviewed with her    Review of Systems    Objective   Wt 91.6 kg (202 lb)   BMI 46.75 kg/m²     Physical Exam vital signs noted alert and oriented x 3 NCAT no JVD chest clear to auscultation cervical spine full range of motion some pain on the right or some warmth to the right shoulder limited ability to lift the arm handgrip is normal DTR 1+ on the right 2+ on the left CV regular rate and rhythm S1-S2 without murmur gallop or rub extremities no clubbing cyanosis or edema normal distal pulses musculoskeletal flatness to the feet with some heel pain on the right    Assessment/Plan    impression blood sugar diagnosis blood pressure diagnoses right shoulder bursitis cervicalgia heel spur pes planus versus plantar fasciitis lung nodule  Plan will schedule for noncontrast chest CT in the near future requisition to be made okay for Medrol 4 mg Dosepak take as directed monitor blood sugars she will have blood work at next visit good water can continue with other medication remeasure for shoe size arch pad ice cold bottle roller exercises or follow-up with podiatry a range of motion exercise for the shoulder follow-up with [place] [facility] or physical therapy they are continue with other medication and recheck 2 weeks on the foot and shoulder she also has some minor cold symptoms may use Mucinex and then follow-up regular visit 3 months  tt40 cc21    Make chest CT requisition review prior labs and set up for next labs when due will redraw A1c (check)

## 2024-11-27 ENCOUNTER — APPOINTMENT (OUTPATIENT)
Dept: PRIMARY CARE | Facility: CLINIC | Age: 64
End: 2024-11-27
Payer: COMMERCIAL

## 2024-12-03 ENCOUNTER — OFFICE VISIT (OUTPATIENT)
Dept: URGENT CARE | Age: 64
End: 2024-12-03
Payer: COMMERCIAL

## 2024-12-03 ENCOUNTER — APPOINTMENT (OUTPATIENT)
Dept: RADIOLOGY | Facility: HOSPITAL | Age: 64
End: 2024-12-03
Payer: COMMERCIAL

## 2024-12-03 ENCOUNTER — HOSPITAL ENCOUNTER (INPATIENT)
Facility: HOSPITAL | Age: 64
LOS: 2 days | Discharge: HOME | End: 2024-12-05
Attending: STUDENT IN AN ORGANIZED HEALTH CARE EDUCATION/TRAINING PROGRAM | Admitting: INTERNAL MEDICINE
Payer: COMMERCIAL

## 2024-12-03 ENCOUNTER — APPOINTMENT (OUTPATIENT)
Dept: CARDIOLOGY | Facility: HOSPITAL | Age: 64
End: 2024-12-03
Payer: COMMERCIAL

## 2024-12-03 VITALS
SYSTOLIC BLOOD PRESSURE: 131 MMHG | WEIGHT: 198 LBS | RESPIRATION RATE: 18 BRPM | HEART RATE: 91 BPM | BODY MASS INDEX: 45.82 KG/M2 | TEMPERATURE: 98.9 F | OXYGEN SATURATION: 92 % | DIASTOLIC BLOOD PRESSURE: 77 MMHG

## 2024-12-03 DIAGNOSIS — R10.9 ABDOMINAL PAIN, UNSPECIFIED ABDOMINAL LOCATION: ICD-10-CM

## 2024-12-03 DIAGNOSIS — K57.32 SIGMOID DIVERTICULITIS: Primary | ICD-10-CM

## 2024-12-03 DIAGNOSIS — R14.0 ABDOMINAL DISTENSION: ICD-10-CM

## 2024-12-03 DIAGNOSIS — R10.817 GENERALIZED ABDOMINAL TENDERNESS, REBOUND TENDERNESS PRESENCE NOT SPECIFIED: Primary | ICD-10-CM

## 2024-12-03 LAB
ALBUMIN SERPL BCP-MCNC: 4 G/DL (ref 3.4–5)
ALP SERPL-CCNC: 86 U/L (ref 33–136)
ALT SERPL W P-5'-P-CCNC: 11 U/L (ref 7–45)
ANION GAP SERPL CALC-SCNC: 12 MMOL/L (ref 10–20)
APPEARANCE UR: CLEAR
AST SERPL W P-5'-P-CCNC: 11 U/L (ref 9–39)
BACTERIA #/AREA URNS AUTO: ABNORMAL /HPF
BASOPHILS # BLD AUTO: 0.02 X10*3/UL (ref 0–0.1)
BASOPHILS NFR BLD AUTO: 0.1 %
BILIRUB SERPL-MCNC: 0.8 MG/DL (ref 0–1.2)
BILIRUB UR STRIP.AUTO-MCNC: NEGATIVE MG/DL
BNP SERPL-MCNC: 41 PG/ML (ref 0–99)
BUN SERPL-MCNC: 16 MG/DL (ref 6–23)
CALCIUM SERPL-MCNC: 10.9 MG/DL (ref 8.6–10.3)
CARDIAC TROPONIN I PNL SERPL HS: 8 NG/L (ref 0–13)
CHLORIDE SERPL-SCNC: 101 MMOL/L (ref 98–107)
CO2 SERPL-SCNC: 26 MMOL/L (ref 21–32)
COLOR UR: ABNORMAL
CREAT SERPL-MCNC: 0.99 MG/DL (ref 0.5–1.05)
EGFRCR SERPLBLD CKD-EPI 2021: 63 ML/MIN/1.73M*2
EOSINOPHIL # BLD AUTO: 0.03 X10*3/UL (ref 0–0.7)
EOSINOPHIL NFR BLD AUTO: 0.2 %
ERYTHROCYTE [DISTWIDTH] IN BLOOD BY AUTOMATED COUNT: 12.9 % (ref 11.5–14.5)
GLUCOSE SERPL-MCNC: 145 MG/DL (ref 74–99)
GLUCOSE UR STRIP.AUTO-MCNC: NORMAL MG/DL
HCT VFR BLD AUTO: 41.1 % (ref 36–46)
HGB BLD-MCNC: 13.3 G/DL (ref 12–16)
IMM GRANULOCYTES # BLD AUTO: 0.08 X10*3/UL (ref 0–0.7)
IMM GRANULOCYTES NFR BLD AUTO: 0.6 % (ref 0–0.9)
KETONES UR STRIP.AUTO-MCNC: ABNORMAL MG/DL
LACTATE SERPL-SCNC: 1.2 MMOL/L (ref 0.4–2)
LEUKOCYTE ESTERASE UR QL STRIP.AUTO: NEGATIVE
LIPASE SERPL-CCNC: 12 U/L (ref 9–82)
LYMPHOCYTES # BLD AUTO: 1.55 X10*3/UL (ref 1.2–4.8)
LYMPHOCYTES NFR BLD AUTO: 11.3 %
MCH RBC QN AUTO: 28.4 PG (ref 26–34)
MCHC RBC AUTO-ENTMCNC: 32.4 G/DL (ref 32–36)
MCV RBC AUTO: 88 FL (ref 80–100)
MONOCYTES # BLD AUTO: 0.85 X10*3/UL (ref 0.1–1)
MONOCYTES NFR BLD AUTO: 6.2 %
MUCOUS THREADS #/AREA URNS AUTO: ABNORMAL /LPF
NEUTROPHILS # BLD AUTO: 11.22 X10*3/UL (ref 1.2–7.7)
NEUTROPHILS NFR BLD AUTO: 81.6 %
NITRITE UR QL STRIP.AUTO: NEGATIVE
NRBC BLD-RTO: 0 /100 WBCS (ref 0–0)
PH UR STRIP.AUTO: 5.5 [PH]
PLATELET # BLD AUTO: 249 X10*3/UL (ref 150–450)
POC APPEARANCE, URINE: CLEAR
POC BILIRUBIN, URINE: ABNORMAL
POC BLOOD, URINE: ABNORMAL
POC COLOR, URINE: ABNORMAL
POC GLUCOSE, URINE: NEGATIVE MG/DL
POC KETONES, URINE: NEGATIVE MG/DL
POC LEUKOCYTES, URINE: NEGATIVE
POC NITRITE,URINE: NEGATIVE
POC PH, URINE: 6 PH
POC PROTEIN, URINE: ABNORMAL MG/DL
POC SPECIFIC GRAVITY, URINE: >=1.03
POC UROBILINOGEN, URINE: 0.2 EU/DL
POTASSIUM SERPL-SCNC: 3.8 MMOL/L (ref 3.5–5.3)
PROT SERPL-MCNC: 7.5 G/DL (ref 6.4–8.2)
PROT UR STRIP.AUTO-MCNC: ABNORMAL MG/DL
RBC # BLD AUTO: 4.69 X10*6/UL (ref 4–5.2)
RBC # UR STRIP.AUTO: ABNORMAL /UL
RBC #/AREA URNS AUTO: ABNORMAL /HPF
SODIUM SERPL-SCNC: 135 MMOL/L (ref 136–145)
SP GR UR STRIP.AUTO: >1.05
SQUAMOUS #/AREA URNS AUTO: ABNORMAL /HPF
UROBILINOGEN UR STRIP.AUTO-MCNC: ABNORMAL MG/DL
WBC # BLD AUTO: 13.8 X10*3/UL (ref 4.4–11.3)
WBC #/AREA URNS AUTO: ABNORMAL /HPF

## 2024-12-03 PROCEDURE — 2550000001 HC RX 255 CONTRASTS: Performed by: PHYSICIAN ASSISTANT

## 2024-12-03 PROCEDURE — 74177 CT ABD & PELVIS W/CONTRAST: CPT | Performed by: RADIOLOGY

## 2024-12-03 PROCEDURE — 93005 ELECTROCARDIOGRAM TRACING: CPT

## 2024-12-03 PROCEDURE — 96374 THER/PROPH/DIAG INJ IV PUSH: CPT

## 2024-12-03 PROCEDURE — 99223 1ST HOSP IP/OBS HIGH 75: CPT | Performed by: INTERNAL MEDICINE

## 2024-12-03 PROCEDURE — 80053 COMPREHEN METABOLIC PANEL: CPT | Performed by: PHYSICIAN ASSISTANT

## 2024-12-03 PROCEDURE — 83880 ASSAY OF NATRIURETIC PEPTIDE: CPT | Performed by: PHYSICIAN ASSISTANT

## 2024-12-03 PROCEDURE — 2500000004 HC RX 250 GENERAL PHARMACY W/ HCPCS (ALT 636 FOR OP/ED): Performed by: INTERNAL MEDICINE

## 2024-12-03 PROCEDURE — 99222 1ST HOSP IP/OBS MODERATE 55: CPT

## 2024-12-03 PROCEDURE — 81001 URINALYSIS AUTO W/SCOPE: CPT | Performed by: PHYSICIAN ASSISTANT

## 2024-12-03 PROCEDURE — 36415 COLL VENOUS BLD VENIPUNCTURE: CPT | Performed by: STUDENT IN AN ORGANIZED HEALTH CARE EDUCATION/TRAINING PROGRAM

## 2024-12-03 PROCEDURE — 99285 EMERGENCY DEPT VISIT HI MDM: CPT | Mod: 25 | Performed by: STUDENT IN AN ORGANIZED HEALTH CARE EDUCATION/TRAINING PROGRAM

## 2024-12-03 PROCEDURE — 1100000001 HC PRIVATE ROOM DAILY

## 2024-12-03 PROCEDURE — 2500000001 HC RX 250 WO HCPCS SELF ADMINISTERED DRUGS (ALT 637 FOR MEDICARE OP): Performed by: INTERNAL MEDICINE

## 2024-12-03 PROCEDURE — 83690 ASSAY OF LIPASE: CPT | Performed by: PHYSICIAN ASSISTANT

## 2024-12-03 PROCEDURE — 84484 ASSAY OF TROPONIN QUANT: CPT | Performed by: PHYSICIAN ASSISTANT

## 2024-12-03 PROCEDURE — 36415 COLL VENOUS BLD VENIPUNCTURE: CPT | Performed by: PHYSICIAN ASSISTANT

## 2024-12-03 PROCEDURE — 85025 COMPLETE CBC W/AUTO DIFF WBC: CPT | Performed by: PHYSICIAN ASSISTANT

## 2024-12-03 PROCEDURE — 83605 ASSAY OF LACTIC ACID: CPT | Performed by: STUDENT IN AN ORGANIZED HEALTH CARE EDUCATION/TRAINING PROGRAM

## 2024-12-03 PROCEDURE — 2500000004 HC RX 250 GENERAL PHARMACY W/ HCPCS (ALT 636 FOR OP/ED): Performed by: STUDENT IN AN ORGANIZED HEALTH CARE EDUCATION/TRAINING PROGRAM

## 2024-12-03 PROCEDURE — 87040 BLOOD CULTURE FOR BACTERIA: CPT | Mod: AHULAB | Performed by: STUDENT IN AN ORGANIZED HEALTH CARE EDUCATION/TRAINING PROGRAM

## 2024-12-03 PROCEDURE — 2500000001 HC RX 250 WO HCPCS SELF ADMINISTERED DRUGS (ALT 637 FOR MEDICARE OP): Performed by: PHYSICIAN ASSISTANT

## 2024-12-03 PROCEDURE — 74177 CT ABD & PELVIS W/CONTRAST: CPT

## 2024-12-03 RX ORDER — ACETAMINOPHEN 650 MG/1
650 SUPPOSITORY RECTAL EVERY 4 HOURS PRN
Status: DISCONTINUED | OUTPATIENT
Start: 2024-12-03 | End: 2024-12-05 | Stop reason: HOSPADM

## 2024-12-03 RX ORDER — TRAMADOL HYDROCHLORIDE 50 MG/1
50 TABLET ORAL EVERY 8 HOURS PRN
Status: DISCONTINUED | OUTPATIENT
Start: 2024-12-03 | End: 2024-12-05 | Stop reason: HOSPADM

## 2024-12-03 RX ORDER — ONDANSETRON HYDROCHLORIDE 2 MG/ML
4 INJECTION, SOLUTION INTRAVENOUS ONCE
Status: DISCONTINUED | OUTPATIENT
Start: 2024-12-03 | End: 2024-12-05 | Stop reason: HOSPADM

## 2024-12-03 RX ORDER — ENOXAPARIN SODIUM 100 MG/ML
40 INJECTION SUBCUTANEOUS EVERY 24 HOURS
Status: DISCONTINUED | OUTPATIENT
Start: 2024-12-03 | End: 2024-12-05 | Stop reason: HOSPADM

## 2024-12-03 RX ORDER — ONDANSETRON HYDROCHLORIDE 2 MG/ML
4 INJECTION, SOLUTION INTRAVENOUS EVERY 8 HOURS PRN
Status: DISCONTINUED | OUTPATIENT
Start: 2024-12-03 | End: 2024-12-05 | Stop reason: HOSPADM

## 2024-12-03 RX ORDER — METRONIDAZOLE 500 MG/100ML
500 INJECTION, SOLUTION INTRAVENOUS EVERY 8 HOURS
Status: DISCONTINUED | OUTPATIENT
Start: 2024-12-03 | End: 2024-12-03 | Stop reason: ALTCHOICE

## 2024-12-03 RX ORDER — ACETAMINOPHEN 325 MG/1
650 TABLET ORAL EVERY 4 HOURS PRN
Status: DISCONTINUED | OUTPATIENT
Start: 2024-12-03 | End: 2024-12-05 | Stop reason: HOSPADM

## 2024-12-03 RX ORDER — METRONIDAZOLE 500 MG/100ML
500 INJECTION, SOLUTION INTRAVENOUS ONCE
Status: DISCONTINUED | OUTPATIENT
Start: 2024-12-03 | End: 2024-12-03 | Stop reason: ALTCHOICE

## 2024-12-03 RX ORDER — ACETAMINOPHEN 160 MG/5ML
650 SOLUTION ORAL EVERY 4 HOURS PRN
Status: DISCONTINUED | OUTPATIENT
Start: 2024-12-03 | End: 2024-12-05 | Stop reason: HOSPADM

## 2024-12-03 RX ORDER — PRAVASTATIN SODIUM 40 MG/1
80 TABLET ORAL NIGHTLY
Status: DISCONTINUED | OUTPATIENT
Start: 2024-12-03 | End: 2024-12-05 | Stop reason: HOSPADM

## 2024-12-03 RX ORDER — CARVEDILOL 25 MG/1
25 TABLET ORAL
Status: DISCONTINUED | OUTPATIENT
Start: 2024-12-03 | End: 2024-12-05 | Stop reason: HOSPADM

## 2024-12-03 RX ORDER — ACETAMINOPHEN 325 MG/1
975 TABLET ORAL ONCE
Status: COMPLETED | OUTPATIENT
Start: 2024-12-03 | End: 2024-12-03

## 2024-12-03 RX ORDER — ASPIRIN 81 MG/1
81 TABLET ORAL DAILY
Status: DISCONTINUED | OUTPATIENT
Start: 2024-12-03 | End: 2024-12-05 | Stop reason: HOSPADM

## 2024-12-03 RX ORDER — CIPROFLOXACIN 2 MG/ML
400 INJECTION, SOLUTION INTRAVENOUS EVERY 12 HOURS
Status: DISCONTINUED | OUTPATIENT
Start: 2024-12-04 | End: 2024-12-03 | Stop reason: ALTCHOICE

## 2024-12-03 RX ORDER — MORPHINE SULFATE 4 MG/ML
4 INJECTION, SOLUTION INTRAMUSCULAR; INTRAVENOUS ONCE
Status: DISCONTINUED | OUTPATIENT
Start: 2024-12-03 | End: 2024-12-05 | Stop reason: HOSPADM

## 2024-12-03 RX ORDER — ONDANSETRON 4 MG/1
4 TABLET, FILM COATED ORAL EVERY 8 HOURS PRN
Status: DISCONTINUED | OUTPATIENT
Start: 2024-12-03 | End: 2024-12-05 | Stop reason: HOSPADM

## 2024-12-03 RX ORDER — CIPROFLOXACIN 2 MG/ML
400 INJECTION, SOLUTION INTRAVENOUS ONCE
Status: COMPLETED | OUTPATIENT
Start: 2024-12-03 | End: 2024-12-03

## 2024-12-03 SDOH — SOCIAL STABILITY: SOCIAL INSECURITY: DO YOU FEEL UNSAFE GOING BACK TO THE PLACE WHERE YOU ARE LIVING?: NO

## 2024-12-03 SDOH — SOCIAL STABILITY: SOCIAL INSECURITY: WERE YOU ABLE TO COMPLETE ALL THE BEHAVIORAL HEALTH SCREENINGS?: YES

## 2024-12-03 SDOH — ECONOMIC STABILITY: INCOME INSECURITY: IN THE PAST 12 MONTHS HAS THE ELECTRIC, GAS, OIL, OR WATER COMPANY THREATENED TO SHUT OFF SERVICES IN YOUR HOME?: NO

## 2024-12-03 SDOH — SOCIAL STABILITY: SOCIAL INSECURITY: HAS ANYONE EVER THREATENED TO HURT YOUR FAMILY OR YOUR PETS?: NO

## 2024-12-03 SDOH — SOCIAL STABILITY: SOCIAL INSECURITY: WITHIN THE LAST YEAR, HAVE YOU BEEN AFRAID OF YOUR PARTNER OR EX-PARTNER?: NO

## 2024-12-03 SDOH — SOCIAL STABILITY: SOCIAL INSECURITY
WITHIN THE LAST YEAR, HAVE YOU BEEN KICKED, HIT, SLAPPED, OR OTHERWISE PHYSICALLY HURT BY YOUR PARTNER OR EX-PARTNER?: NO

## 2024-12-03 SDOH — ECONOMIC STABILITY: FOOD INSECURITY: WITHIN THE PAST 12 MONTHS, THE FOOD YOU BOUGHT JUST DIDN'T LAST AND YOU DIDN'T HAVE MONEY TO GET MORE.: NEVER TRUE

## 2024-12-03 SDOH — SOCIAL STABILITY: SOCIAL INSECURITY: WITHIN THE LAST YEAR, HAVE YOU BEEN HUMILIATED OR EMOTIONALLY ABUSED IN OTHER WAYS BY YOUR PARTNER OR EX-PARTNER?: NO

## 2024-12-03 SDOH — SOCIAL STABILITY: SOCIAL INSECURITY
WITHIN THE LAST YEAR, HAVE YOU BEEN RAPED OR FORCED TO HAVE ANY KIND OF SEXUAL ACTIVITY BY YOUR PARTNER OR EX-PARTNER?: NO

## 2024-12-03 SDOH — SOCIAL STABILITY: SOCIAL INSECURITY: ARE THERE ANY APPARENT SIGNS OF INJURIES/BEHAVIORS THAT COULD BE RELATED TO ABUSE/NEGLECT?: NO

## 2024-12-03 SDOH — SOCIAL STABILITY: SOCIAL INSECURITY: ABUSE: ADULT

## 2024-12-03 SDOH — ECONOMIC STABILITY: FOOD INSECURITY: WITHIN THE PAST 12 MONTHS, YOU WORRIED THAT YOUR FOOD WOULD RUN OUT BEFORE YOU GOT THE MONEY TO BUY MORE.: NEVER TRUE

## 2024-12-03 SDOH — SOCIAL STABILITY: SOCIAL INSECURITY: ARE YOU OR HAVE YOU BEEN THREATENED OR ABUSED PHYSICALLY, EMOTIONALLY, OR SEXUALLY BY ANYONE?: NO

## 2024-12-03 SDOH — SOCIAL STABILITY: SOCIAL INSECURITY: HAVE YOU HAD THOUGHTS OF HARMING ANYONE ELSE?: NO

## 2024-12-03 SDOH — SOCIAL STABILITY: SOCIAL INSECURITY: DO YOU FEEL ANYONE HAS EXPLOITED OR TAKEN ADVANTAGE OF YOU FINANCIALLY OR OF YOUR PERSONAL PROPERTY?: NO

## 2024-12-03 SDOH — SOCIAL STABILITY: SOCIAL INSECURITY: DOES ANYONE TRY TO KEEP YOU FROM HAVING/CONTACTING OTHER FRIENDS OR DOING THINGS OUTSIDE YOUR HOME?: NO

## 2024-12-03 ASSESSMENT — LIFESTYLE VARIABLES
HOW OFTEN DO YOU HAVE 6 OR MORE DRINKS ON ONE OCCASION: NEVER
HOW OFTEN DO YOU HAVE A DRINK CONTAINING ALCOHOL: NEVER
AUDIT-C TOTAL SCORE: 0
HOW MANY STANDARD DRINKS CONTAINING ALCOHOL DO YOU HAVE ON A TYPICAL DAY: PATIENT DOES NOT DRINK
SKIP TO QUESTIONS 9-10: 1
AUDIT-C TOTAL SCORE: 0

## 2024-12-03 ASSESSMENT — PATIENT HEALTH QUESTIONNAIRE - PHQ9
SUM OF ALL RESPONSES TO PHQ9 QUESTIONS 1 AND 2: 0
2. FEELING DOWN, DEPRESSED OR HOPELESS: NOT AT ALL
SUM OF ALL RESPONSES TO PHQ9 QUESTIONS 1 AND 2: 0
1. LITTLE INTEREST OR PLEASURE IN DOING THINGS: NOT AT ALL
2. FEELING DOWN, DEPRESSED OR HOPELESS: NOT AT ALL
1. LITTLE INTEREST OR PLEASURE IN DOING THINGS: NOT AT ALL
SUM OF ALL RESPONSES TO PHQ9 QUESTIONS 1 & 2: 0
1. LITTLE INTEREST OR PLEASURE IN DOING THINGS: NOT AT ALL
2. FEELING DOWN, DEPRESSED OR HOPELESS: NOT AT ALL

## 2024-12-03 ASSESSMENT — ACTIVITIES OF DAILY LIVING (ADL)
WALKS IN HOME: INDEPENDENT
BATHING: INDEPENDENT
GROOMING: INDEPENDENT
HEARING - LEFT EAR: FUNCTIONAL
JUDGMENT_ADEQUATE_SAFELY_COMPLETE_DAILY_ACTIVITIES: YES
ASSISTIVE_DEVICE: EYEGLASSES
HEARING - RIGHT EAR: FUNCTIONAL
ADEQUATE_TO_COMPLETE_ADL: YES
TOILETING: INDEPENDENT
PATIENT'S MEMORY ADEQUATE TO SAFELY COMPLETE DAILY ACTIVITIES?: YES
FEEDING YOURSELF: INDEPENDENT
LACK_OF_TRANSPORTATION: NO
DRESSING YOURSELF: INDEPENDENT

## 2024-12-03 ASSESSMENT — COGNITIVE AND FUNCTIONAL STATUS - GENERAL
DAILY ACTIVITIY SCORE: 24
PATIENT BASELINE BEDBOUND: NO
MOBILITY SCORE: 24

## 2024-12-03 ASSESSMENT — PAIN DESCRIPTION - FREQUENCY: FREQUENCY: CONSTANT/CONTINUOUS

## 2024-12-03 ASSESSMENT — PAIN DESCRIPTION - DESCRIPTORS: DESCRIPTORS: SORE;CRAMPING

## 2024-12-03 ASSESSMENT — PAIN DESCRIPTION - PAIN TYPE: TYPE: ACUTE PAIN

## 2024-12-03 ASSESSMENT — PAIN DESCRIPTION - ONSET: ONSET: PROGRESSIVE

## 2024-12-03 ASSESSMENT — PAIN SCALES - GENERAL
PAINLEVEL_OUTOF10: 0 - NO PAIN
PAINLEVEL_OUTOF10: 7

## 2024-12-03 ASSESSMENT — PAIN DESCRIPTION - PROGRESSION: CLINICAL_PROGRESSION: NOT CHANGED

## 2024-12-03 ASSESSMENT — PAIN - FUNCTIONAL ASSESSMENT
PAIN_FUNCTIONAL_ASSESSMENT: 0-10
PAIN_FUNCTIONAL_ASSESSMENT: 0-10

## 2024-12-03 ASSESSMENT — PAIN DESCRIPTION - LOCATION: LOCATION: ABDOMEN

## 2024-12-03 NOTE — PROGRESS NOTES
Pharmacy Medication History     Additional concerns with the patient's PTA list.   Confirmed all medications    The following updates were made to the Prior to Admission medication list:     Source of Information:     Medications ADDED:   N/a  Medications CHANGED:  N/a  Medications REMOVED:   N/a  Medications NOT TAKING:   N/a    Allergy reviewed : Yes    Meds 2 Beds : No    Comments:      The list below reflectives the updated PTA list. Please review each medication in order reconciliation for additional clarification and justification.    Prior to Admission Medications   Prescriptions Last Dose Informant   aspirin 81 mg EC tablet Unknown Self   Sig: Take 1 tablet (81 mg) by mouth once daily. Take with food.   carvedilol (Coreg) 25 mg tablet Unknown Self   Sig: Take 1 tablet (25 mg) by mouth 2 times a day with meals.   cod liver oiL oil Unknown Self   Sig: Take 1 capsule by mouth once daily.   pravastatin (Pravachol) 80 mg tablet Unknown Self   Sig: Take 1 tablet (80 mg) by mouth once daily.   spironolacton-hydrochlorothiaz (Aldactazide) 25-25 mg tablet Unknown Self   Sig: Take 1 tablet (25 mg) by mouth once daily.      Facility-Administered Medications: None       The list below reflectives the updated allergy list. Please review each documented allergy for additional clarification and justification.    No Known Allergies       12/03/24 at 5:05 PM - Cary Becerra

## 2024-12-03 NOTE — ED PROVIDER NOTES
"[facility] ED Note    Date of Service: 12/3/2024  Reason for Visit: Abdominal Pain      Patient History     [facility]  Moni Riggs is a 64 y.o. female ***      Past Medical History:   Diagnosis Date    Personal history of other diseases of the circulatory system     History of hypertension    Polyp of corpus uteri 05/15/2014    Uterine polyp     Past Surgical History:   Procedure Laterality Date     SECTION, CLASSIC  05/15/2014     Section    COLONOSCOPY  05/15/2014    Complete Colonoscopy         Physical Exam     Vitals:    24 1335   BP: 143/68   BP Location: Left arm   Patient Position: Sitting   Pulse: 92   Resp: 18   Temp: 36.6 °C (97.9 °F)   TempSrc: Temporal   SpO2: 97%   Weight: 89.8 kg (198 lb)   Height: 1.499 m (4' 11\")     General:  Well appearing. No acute distress***  Eyes:  PERRL. EOMI. No discharge from eyes   ENT:  No oropharyngeal edema. Tolerating secretions.   Pulmonary:   Non-labored breathing. Breath sounds clear bilaterally  Cardiac:  Regular rate   Abdomen:  Soft. Non-tender. Non-distended  Musculoskeletal:  No long bone deformity. No peripheral edema   Skin:  Dry, no rashes  ***Neuro:  Alert. Moves all four extremities to command. No focal deficit    Diagnostic Studies     Labs:  Please see EMR for labs obtained during this patient encounter.    Radiology:  Please see EMR for imaging obtained during this patient encounter.    EKG:  {ED EK}      ED Course and MDM     Moni Riggs is a 64 y.o. female with a history and presentation as described above in HPI.      Upon presentation, the patient was *** well appearing and had *** vitals.    ***    Medical Decision Making         Critical Care Time   CRITICAL CARE TIME    Upon my evaluation, this patient had a high probability of imminent or life-threatening deterioration due to ___(condition)__, which required my direct attention, intervention, and personal management.    I have personally " provided ___ minutes of critical care time exclusive of time spent on separately billable procedures. Time includes review of laboratory data, radiology results, discussion with consultants, and monitoring for potential decompensation. Interventions were performed as documented above.    Impression     No diagnosis found.     Plan       ***Discharge, see DCI provided    ***Admit to ***, as *** status for further evaluation and management of ***    *** At this time I am going off-service and will be signing out care of this patient to my colleague  *** for further care. My colleague's responsibilities will include:        Luis Alberto Valera MD  [facility] Emergency Medicine

## 2024-12-03 NOTE — H&P
History Of Present Illness  Moni Riggs is a 64 y.o. female presenting with abdominal pain and nausea..  64-year-old female with history of hypertension,, hyperlipidemia and overweight presents with 3-day history of abdominal pain mostly left half of the abdomen with some nausea and therefore she presented to the emergency room.  Her white cell count was 13,000, patient was afebrile and CT scan of the abdomen was consistent with sigmoid colon acute diverticulitis with microperforation but no true abscess formation.  Patient is admitted to medicine, started on clear liquids pain management and IV antibiotics initially she received IV Cipro and Flagyl which have changed to IV Zosyn.  She is hemodynamically stable otherwise.  Her last bowel movement was yesterday, last colonoscopy was within a year.     Past Medical History  Past Medical History:   Diagnosis Date    Personal history of other diseases of the circulatory system     History of hypertension    Polyp of corpus uteri 05/15/2014    Uterine polyp       Surgical History  Past Surgical History:   Procedure Laterality Date     SECTION, CLASSIC  05/15/2014     Section    COLONOSCOPY  05/15/2014    Complete Colonoscopy        Social History  She reports that she has never smoked. She has never used smokeless tobacco. No history on file for alcohol use and drug use.    Family History  Family History   Problem Relation Name Age of Onset    Other (Cardiac disorder) Mother      COPD Mother      Multiple myeloma Father      Hypertension Brother      Heart attack Mother's Sister      Hypertension Father's Sister      Hypertension Father's Brother      Heart attack Maternal Grandmother      Diabetes Other Uncle     Lung cancer Other Grandmother     Stroke Other Aunt         Allergies  Patient has no known allergies.    Review of Systems  COMPLETE REVIEW OF SYSTEMS:    GENERAL: No weight loss, malaise or fevers., SEE HPI  HEENT: Negative for  frequent or significant headaches, No changes in hearing or vision, no nose bleeds or other nasal problems  NECK: Negative for lumps, goiter, pain and significant neck swelling  RESPIRATORY: Negative for cough, wheezing or shortness of breath.  CARDIOVASCULAR: Negative for chest pain, leg swelling or palpitations.  GI: Positive for abdominal discomfort, no blood in stools or black stools or change in bowel habits  : No history of dysuria, frequency or incontinence  GYN: Negative for abnormal vaginal bleeding, abnormal vaginal discharge.    MUSCULOSKELETAL: Negative for joint pain or swelling, back pain or muscle pain.  SKIN: Negative for lesions, rash, and itching.  PSYCH: Negative for sleep disturbance, mood disorder and recent psychosocial stressors.  HEMATOLOGY/LYMPHOLOGY: Negative for prolonged bleeding, bruising easily or swollen nodes.  ENDOCRINE: Negative for cold or heat intolerance, polyuria, polydipsia and goiter.  NEURO: No history of headaches, syncope, paralysis, seizures or tremors  All other reviewed and negative other than HPI.         Physical Exam  GENERAL: healthy, alert, no distress, cooperative, obese  SKIN: Skin color, texture, turgor normal. No rashes or lesions.  HEAD/SINUSES: No significant findings.  EYES: PERRLA and EOMI  EARS: external ears normal,  NOSE:  Septum midline.  OROPHARYNX: Lips, mucosa, and tongue normal. Teeth and gums normal. Oropharynx normal.  NECK: no jugulovenous distention, no carotid bruits, carotid pulse normal contour, supple  BACK:  No CVAT.  LUNGS: Lungs clear to auscultation. Good diaphragmatic excursion.  CARDIAC: Rate rhythm is regular, normal S1 and S2; no rubs, murmurs, or gallops  ABDOMEN: Abdomen soft, large, mildly distended, mostly tender in the left half of the abdomen and left lower quadrant, no guarding or rigidity, . BS normal. No masses or organomegaly.  EXTREMITIES: Extremities normal. No deformities, edema, clubbing or skin discoloration.  NEURO:  "Gait not examined. Reflexes normal and symmetric. Sensation grossly intact., Cranial nerves II-XII intact  PULSES: 2+ radial, 2+ carotid  RECTAL: not done     Last Recorded Vitals  Blood pressure 138/78, pulse 88, temperature 36.6 °C (97.9 °F), temperature source Temporal, resp. rate 16, height 1.499 m (4' 11\"), weight 89.8 kg (198 lb), SpO2 97%.    Relevant Results  Scheduled medications  aspirin, 81 mg, oral, Daily  carvedilol, 25 mg, oral, BID  enoxaparin, 40 mg, subcutaneous, q24h  morphine, 4 mg, intravenous, Once  ondansetron, 4 mg, intravenous, Once  piperacillin-tazobactam, 3.375 g, intravenous, q6h  pravastatin, 80 mg, oral, Nightly  sodium chloride, 1,000 mL, intravenous, Once      Continuous medications     PRN medications  PRN medications: acetaminophen **OR** acetaminophen **OR** acetaminophen, ondansetron **OR** ondansetron, traMADol      Results for orders placed or performed during the hospital encounter of 12/03/24 (from the past 96 hours)   CBC and Auto Differential   Result Value Ref Range    WBC 13.8 (H) 4.4 - 11.3 x10*3/uL    nRBC 0.0 0.0 - 0.0 /100 WBCs    RBC 4.69 4.00 - 5.20 x10*6/uL    Hemoglobin 13.3 12.0 - 16.0 g/dL    Hematocrit 41.1 36.0 - 46.0 %    MCV 88 80 - 100 fL    MCH 28.4 26.0 - 34.0 pg    MCHC 32.4 32.0 - 36.0 g/dL    RDW 12.9 11.5 - 14.5 %    Platelets 249 150 - 450 x10*3/uL    Neutrophils % 81.6 40.0 - 80.0 %    Immature Granulocytes %, Automated 0.6 0.0 - 0.9 %    Lymphocytes % 11.3 13.0 - 44.0 %    Monocytes % 6.2 2.0 - 10.0 %    Eosinophils % 0.2 0.0 - 6.0 %    Basophils % 0.1 0.0 - 2.0 %    Neutrophils Absolute 11.22 (H) 1.20 - 7.70 x10*3/uL    Immature Granulocytes Absolute, Automated 0.08 0.00 - 0.70 x10*3/uL    Lymphocytes Absolute 1.55 1.20 - 4.80 x10*3/uL    Monocytes Absolute 0.85 0.10 - 1.00 x10*3/uL    Eosinophils Absolute 0.03 0.00 - 0.70 x10*3/uL    Basophils Absolute 0.02 0.00 - 0.10 x10*3/uL   Comprehensive Metabolic Panel   Result Value Ref Range    Glucose " 145 (H) 74 - 99 mg/dL    Sodium 135 (L) 136 - 145 mmol/L    Potassium 3.8 3.5 - 5.3 mmol/L    Chloride 101 98 - 107 mmol/L    Bicarbonate 26 21 - 32 mmol/L    Anion Gap 12 10 - 20 mmol/L    Urea Nitrogen 16 6 - 23 mg/dL    Creatinine 0.99 0.50 - 1.05 mg/dL    eGFR 63 >60 mL/min/1.73m*2    Calcium 10.9 (H) 8.6 - 10.3 mg/dL    Albumin 4.0 3.4 - 5.0 g/dL    Alkaline Phosphatase 86 33 - 136 U/L    Total Protein 7.5 6.4 - 8.2 g/dL    AST 11 9 - 39 U/L    Bilirubin, Total 0.8 0.0 - 1.2 mg/dL    ALT 11 7 - 45 U/L   Troponin I, High Sensitivity   Result Value Ref Range    Troponin I, High Sensitivity 8 0 - 13 ng/L   Lipase   Result Value Ref Range    Lipase 12 9 - 82 U/L   B-type natriuretic peptide   Result Value Ref Range    BNP 41 0 - 99 pg/mL   ECG 12 Lead   Result Value Ref Range    Ventricular Rate 90 BPM    Atrial Rate 90 BPM    UT Interval 208 ms    QRS Duration 84 ms    QT Interval 344 ms    QTC Calculation(Bazett) 420 ms    P Axis 64 degrees    R Axis -32 degrees    T Axis 86 degrees    QRS Count 14 beats    Q Onset 227 ms    P Onset 123 ms    P Offset 180 ms    T Offset 399 ms    QTC Fredericia 393 ms   Urinalysis with Reflex Culture and Microscopic   Result Value Ref Range    Color, Urine Light-Orange (N) Light-Yellow, Yellow, Dark-Yellow    Appearance, Urine Clear Clear    Specific Gravity, Urine >1.050 (N) 1.005 - 1.035    pH, Urine 5.5 5.0, 5.5, 6.0, 6.5, 7.0, 7.5, 8.0    Protein, Urine 50 (1+) (A) NEGATIVE, 10 (TRACE), 20 (TRACE) mg/dL    Glucose, Urine Normal Normal mg/dL    Blood, Urine 0.1 (1+) (A) NEGATIVE    Ketones, Urine TRACE (A) NEGATIVE mg/dL    Bilirubin, Urine NEGATIVE NEGATIVE    Urobilinogen, Urine 2 (1+) (A) Normal mg/dL    Nitrite, Urine NEGATIVE NEGATIVE    Leukocyte Esterase, Urine NEGATIVE NEGATIVE   Urinalysis Microscopic   Result Value Ref Range    WBC, Urine 1-5 1-5, NONE /HPF    RBC, Urine 11-20 (A) NONE, 1-2, 3-5 /HPF    Squamous Epithelial Cells, Urine 1-9 (SPARSE) Reference range  not established. /HPF    Bacteria, Urine 1+ (A) NONE SEEN /HPF    Mucus, Urine 4+ Reference range not established. /LPF   Lactate   Result Value Ref Range    Lactate 1.2 0.4 - 2.0 mmol/L    CT abdomen pelvis w IV contrast    Result Date: 12/3/2024  Interpreted By:  Allison Schneider, STUDY: CT ABDOMEN PELVIS W IV CONTRAST;  12/3/2024 3:25 pm   INDICATION: Signs/Symptoms:diffuse abd pain; distention.   COMPARISON: 04/24/2020   ACCESSION NUMBER(S): [id number]   ORDERING CLINICIAN: AMBROCIO HAWKINS   TECHNIQUE: CT of the abdomen and pelvis was performed following the intravenous administration 75 mL Omnipaque 350. Sagittal and coronal reconstructions were generated.   FINDINGS: LOWER CHEST: There are coronary artery calcifications.   ABDOMEN:   LIVER: Unremarkable   BILE DUCTS: Unremarkable   GALLBLADDER: No obvious gallstones.   PANCREAS: Unremarkable   SPLEEN: Unremarkable   ADRENAL GLANDS: There are no adrenal masses.   KIDNEYS AND URETERS: The kidneys are not obstructed. There are no obvious renal calculi.   The ureters are normal in caliber.   PELVIS:   BLADDER: Bladder is unremarkable.   REPRODUCTIVE ORGANS: There is slightly enlarged lobulated uterus consistent with fibroids.   BOWEL: There is no significant bowel distention. There are colonic diverticula.   There is thickening of the wall of sigmoid colon with surrounding edema and small extraluminal air collections consistent with acute diverticulitis. There is no discrete abscess.   There is a normal caliber appendix.   VESSELS: There are atherosclerotic changes of the aorta. The cava is unremarkable.   PERITONEUM/RETROPERITONEUM/LYMPH NODES: There are no large collections of extraluminal air. There is no significant peritoneal fluid.   There are numerous small pelvic lymph nodes.   BONES AND ABDOMINAL WALL: There are degenerative changes of the spine.   COMPARISON OF FINDINGS: The diverticulitis was not present previously.       Findings consistent with the  sigmoid diverticulitis. There are small extraluminal air collections. There is inflammatory change around adjacent small bowels. A secure chest message was sent to Dr. Finn.   There are numerous small mesenteric and pelvic lymph nodes.   MACRO: none   Signed by: Allison Schneider 12/3/2024 4:07 PM Dictation workstation:   [id number]    ECG 12 Lead    Result Date: 12/3/2024  Normal sinus rhythm Left axis deviation Nonspecific T wave abnormality Abnormal ECG When compared with ECG of 03-FEB-2023 10:01, Premature atrial complexes are no longer Present T wave inversion less evident in Lateral leads    Assessment/Plan   Assessment & Plan  Sigmoid diverticulitis    64-year-old lady with history of hypertension, overweight, hyperlipidemia presents with diffuse abdominal pain mostly left half of the abdomen and white cell count is 13,000 and a CT of the abdomen is consistent with acute diverticulitis with microperforation but no abscess formation.  Her other lab work revealed mildly elevated calcium of 10.9 with normal albumin of 4.0.    1.  Acute sigmoid diverticulitis  Admit to medicine, surgical consult, IV fluids and IV antibiotics and pain medications and monitor closely  Last colonoscopy was within the year.    2.  Hypertension resume home medications hold spironolactone and hydrochlorothiazide    3.  Hyperlipidemia continue with pravastatin    4.  Mild hypercalcemia with a serum calcium of 10.9 and serum albumin of 4.0, repeat serum calcium in the morning and check ionized calcium.    Reviewed all labs and imaging studies, discussed the plan of treatment with patient in detail, surgical consult appreciated.         I spent 75 minutes in the professional and overall care of this patient.      Kavon Juárez MD

## 2024-12-03 NOTE — ED TRIAGE NOTES
"TRIAGE NOTE   Secondary to high patient volumes and overcrowding, I saw the patient as the Clinician in Triage and performed a brief history and physical exam, established acuity, and ordered appropriate tests to develop basic plan of care. Once ED bed available, patient will subsequently be seen by an ANNA, resident and/or physician who will independently evaluate the patient. Please see next provider's notes for further details and disposition.      Brief HPI:   Patient is 64-year-old female complicated PMH including HTN, HLD, COPD, HOCM, who presents to the ED at the referral of urgent care for evaluation of abdominal pain and distention.  Patient reports that she has been experiencing generalized periumbilical abdominal pain since  evening.  She had Posta salad and ham to eat before pain started.  Localizes pain to the middle of her stomach.  Does not radiate.  Describes it as \"sore to touch\".  States that pain is worse when she walks because it makes her stomach feel heavy.  She has tried Pepto-Bismol, Tums and took a laxative at 1600 yesterday all without relief.  Rates pain 8/10.  Last bowel movement was yesterday after taking laxative.  She said it was runny.  She is still passing gas.  She denies urinary urgency, frequency, dysuria or hematuria but does state that her urine appeared orange when she voided earlier today.  She denies any nausea but endorses decreased appetite.  Denies any vomiting, chest pain, shortness of breath, palpitations, leg swelling, history of liver disease.  Had a  48 years ago otherwise denied previous abdominal surgery.     Focused Physical exam:   Abdomen tense, distended.  Bowel sounds present.  Heart rate regular.  Bilateral lower extremities edematous.    Plan/MDM:   Labs, CT abdomen pelvis with IV contrast, IV morphine and IV Zofran, may necessitate admission    Please see subsequent provider note for further details and disposition     "

## 2024-12-03 NOTE — PATIENT INSTRUCTIONS
64-year-old female with acute abdominal pain with tenderness and exam, very distended and tense, ongoing since Sunday, 2 days ago.  Patient otherwise alert and oriented.  Referred to [facility] ED for further evaluation and management.  Patient is stable to go by private vehicle.  N.p.o. advised.  I spoke with JASMIN Rodas at [facility] ED.

## 2024-12-03 NOTE — ED TRIAGE NOTES
Pt states that she developed all over abd pain Sunday evening that has not gotten worse or better. Pt described it as 7/10 cramping sore feeling. Pt states that she took dulcolax around 1600 yesterday.

## 2024-12-03 NOTE — CONSULTS
Reason For Consult  Sigmoid diverticulitis with extraluminal air collections    History Of Present Illness  Moni Riggs is a 64 y.o. female presenting with generalized abdominal pains and distensions that began this . She saw medical attention at the urgent care where she was subsequently sent to the ED. She said her abdominal pain has improved since arrival but she is still feeling bloaty. She had a liquidy bowel movement yesterday with the aid of laxatives. She denied nausea, vomiting, blood in stools, urinary problems, fevers, or chills. Only abdominal surgery was a  may years ago. Do not take blood thinning medications. Surgery consulted for further management.      Past Medical History  She has a past medical history of Personal history of other diseases of the circulatory system and Polyp of corpus uteri (05/15/2014).    Surgical History  She has a past surgical history that includes Colonoscopy (05/15/2014) and  section, classic (05/15/2014).     Social History  She reports that she has never smoked. She has never used smokeless tobacco. No history on file for alcohol use and drug use.    Family History  Family History   Problem Relation Name Age of Onset    Other (Cardiac disorder) Mother      COPD Mother      Multiple myeloma Father      Hypertension Brother      Heart attack Mother's Sister      Hypertension Father's Sister      Hypertension Father's Brother      Heart attack Maternal Grandmother      Diabetes Other Uncle     Lung cancer Other Grandmother     Stroke Other Aunt         Allergies  Patient has no known allergies.    Review of Systems  A full 10 point ROS was completed. Nothing positive other than what was mentioned in the HPI.     Physical Exam  PE:  Constitutional: A&Ox3, calm and cooperative, NAD  Cardiovascular: Normal rate and regular rhythm.  Respiratory/Thorax: Good symmetric chest expansion. No labored breathing.  Gastrointestinal: Abdomen moderately  "distended, soft, mild diffuse tenderness, more-so in the LLQ   Extremities: No peripheral edema  Neurological: A&Ox3, No focal deficits  Psychological: Appropriate mood and behavior  Skin: Warm and dry     Last Recorded Vitals  Blood pressure 143/68, pulse 92, temperature 36.6 °C (97.9 °F), temperature source Temporal, resp. rate 18, height 1.499 m (4' 11\"), weight 89.8 kg (198 lb), SpO2 97%.    Relevant Results  Scheduled medications  ciprofloxacin, 400 mg, intravenous, Once  metroNIDAZOLE, 500 mg, intravenous, Once  morphine, 4 mg, intravenous, Once  ondansetron, 4 mg, intravenous, Once  sodium chloride, 1,000 mL, intravenous, Once      Continuous medications     PRN medications    Results for orders placed or performed during the hospital encounter of 12/03/24 (from the past 24 hours)   CBC and Auto Differential   Result Value Ref Range    WBC 13.8 (H) 4.4 - 11.3 x10*3/uL    nRBC 0.0 0.0 - 0.0 /100 WBCs    RBC 4.69 4.00 - 5.20 x10*6/uL    Hemoglobin 13.3 12.0 - 16.0 g/dL    Hematocrit 41.1 36.0 - 46.0 %    MCV 88 80 - 100 fL    MCH 28.4 26.0 - 34.0 pg    MCHC 32.4 32.0 - 36.0 g/dL    RDW 12.9 11.5 - 14.5 %    Platelets 249 150 - 450 x10*3/uL    Neutrophils % 81.6 40.0 - 80.0 %    Immature Granulocytes %, Automated 0.6 0.0 - 0.9 %    Lymphocytes % 11.3 13.0 - 44.0 %    Monocytes % 6.2 2.0 - 10.0 %    Eosinophils % 0.2 0.0 - 6.0 %    Basophils % 0.1 0.0 - 2.0 %    Neutrophils Absolute 11.22 (H) 1.20 - 7.70 x10*3/uL    Immature Granulocytes Absolute, Automated 0.08 0.00 - 0.70 x10*3/uL    Lymphocytes Absolute 1.55 1.20 - 4.80 x10*3/uL    Monocytes Absolute 0.85 0.10 - 1.00 x10*3/uL    Eosinophils Absolute 0.03 0.00 - 0.70 x10*3/uL    Basophils Absolute 0.02 0.00 - 0.10 x10*3/uL   Comprehensive Metabolic Panel   Result Value Ref Range    Glucose 145 (H) 74 - 99 mg/dL    Sodium 135 (L) 136 - 145 mmol/L    Potassium 3.8 3.5 - 5.3 mmol/L    Chloride 101 98 - 107 mmol/L    Bicarbonate 26 21 - 32 mmol/L    Anion Gap 12 10 " - 20 mmol/L    Urea Nitrogen 16 6 - 23 mg/dL    Creatinine 0.99 0.50 - 1.05 mg/dL    eGFR 63 >60 mL/min/1.73m*2    Calcium 10.9 (H) 8.6 - 10.3 mg/dL    Albumin 4.0 3.4 - 5.0 g/dL    Alkaline Phosphatase 86 33 - 136 U/L    Total Protein 7.5 6.4 - 8.2 g/dL    AST 11 9 - 39 U/L    Bilirubin, Total 0.8 0.0 - 1.2 mg/dL    ALT 11 7 - 45 U/L   Troponin I, High Sensitivity   Result Value Ref Range    Troponin I, High Sensitivity 8 0 - 13 ng/L   Lipase   Result Value Ref Range    Lipase 12 9 - 82 U/L   B-type natriuretic peptide   Result Value Ref Range    BNP 41 0 - 99 pg/mL   ECG 12 Lead   Result Value Ref Range    Ventricular Rate 90 BPM    Atrial Rate 90 BPM    VT Interval 208 ms    QRS Duration 84 ms    QT Interval 344 ms    QTC Calculation(Bazett) 420 ms    P Axis 64 degrees    R Axis -32 degrees    T Axis 86 degrees    QRS Count 14 beats    Q Onset 227 ms    P Onset 123 ms    P Offset 180 ms    T Offset 399 ms    QTC Fredericia 393 ms       Assessment/Plan     Patient does not appear to be in acute distress. She is hemodynamically stable. Afebrile. Imaging reviewed. Labs reviewed- remarkable for WBC of 13.8.     Plan:  - No emergent surgical intervention warranted at this time  - Medicine admit  - Begin broad spectrum abx  - NPO, chips and sips ok  - IVF  - Supportive care    Surgery to follow her clinical progression closely.    Discussed with Dr Gardner.    I spent 60 minutes in the professional and overall care of this patient.    Pb Mayorga PA-C

## 2024-12-03 NOTE — PROGRESS NOTES
Subjective   Patient ID: Moni Riggs is a 64 y.o. female. They present today with a chief complaint of Abdominal Pain (Stomach ache,sore to touch, xSunday. Laxative and pepto bismol. CGMA).    History of Present Illness  HPI    64-year-old female complaining of stomachaches around her bellybutton since , 2 days ago.  Abdomen is swollen, sore to touch.  She has loss of appetite.  Denies nausea, vomiting, diarrhea, constipation, dysuria.  Past history of  48 years ago, denies other abdominal surgeries.      Past Medical History  Allergies as of 2024    (No Known Allergies)       (Not in a hospital admission)       Past Medical History:   Diagnosis Date    Personal history of other diseases of the circulatory system     History of hypertension    Polyp of corpus uteri 05/15/2014    Uterine polyp       Past Surgical History:   Procedure Laterality Date     SECTION, CLASSIC  05/15/2014     Section    COLONOSCOPY  05/15/2014    Complete Colonoscopy        reports that she has never smoked. She has never used smokeless tobacco.    Review of Systems  Review of Systems                               Objective    Vitals:    24 1143   BP: 131/77   BP Location: Left arm   Patient Position: Sitting   Pulse: 91   Resp: 18   Temp: 37.2 °C (98.9 °F)   TempSrc: Oral   SpO2: 92%   Weight: 89.8 kg (198 lb)     No LMP recorded. Patient is postmenopausal.    Physical Exam    Constitutional: Vital signs reviewed. Well developed and well nourished. Patient alert and without distress.      Cardiovascular:  No peripheral edema.    Pulmonary: No respiratory distress.     Abdomen: Hyperactive bowel sounds, distended abdomen diffusely but mostly above the bellybutton, tenderness and firm with involuntary guarding.  Tender diffusely without obvious rebound tenderness.  Negative CVA tenderness.    Neurologic: Cortical function: Normal      Procedures    Point of Care Test & Imaging Results from  this visit  Results for orders placed or performed in visit on 12/03/24   POCT UA Automated manually resulted   Result Value Ref Range    POC Color, Urine Sulma (A) Straw, Yellow, Light-Yellow    POC Appearance, Urine Clear Clear    POC Glucose, Urine NEGATIVE NEGATIVE mg/dl    POC Bilirubin, Urine SMALL (1+) (A) NEGATIVE    POC Ketones, Urine NEGATIVE NEGATIVE mg/dl    POC Specific Gravity, Urine >=1.030 1.005 - 1.035    POC Blood, Urine MODERATE (2+) (A) NEGATIVE    POC PH, Urine 6.0 No Reference Range Established PH    POC Protein, Urine 15 (1+) (A) NEGATIVE, 30 (1+) mg/dl    POC Urobilinogen, Urine 0.2 0.2, 1.0 EU/DL    Poc Nitrite, Urine NEGATIVE NEGATIVE    POC Leukocytes, Urine NEGATIVE NEGATIVE      No results found.    Diagnostic study results (if any) were reviewed by Ruma Nazario.    Assessment/Plan   Allergies, medications, history, and pertinent labs/EKGs/Imaging reviewed by Ruma Nazario.     Medical Decision Making  64-year-old female with acute abdominal pain with tenderness and exam, very distended and tense, ongoing since Sunday, 2 days ago.  Patient otherwise alert and oriented.  Referred to [facility] ED for further evaluation and management.  Patient is stable to go by private vehicle.  N.p.o. advised.  I spoke with JASMIN Rodas at [facility] ED.      Orders and Diagnoses  Diagnoses and all orders for this visit:  Generalized abdominal tenderness, rebound tenderness presence not specified  Abdominal pain, unspecified abdominal location  -     POCT UA Automated manually resulted  Abdominal distension      Medical Admin Record      Patient disposition: ED    Electronically signed by Ruma Nazario  1:00 PM

## 2024-12-04 LAB
ALBUMIN SERPL BCP-MCNC: 3.4 G/DL (ref 3.4–5)
ALP SERPL-CCNC: 83 U/L (ref 33–136)
ALT SERPL W P-5'-P-CCNC: 10 U/L (ref 7–45)
ANION GAP SERPL CALC-SCNC: 12 MMOL/L (ref 10–20)
AST SERPL W P-5'-P-CCNC: 13 U/L (ref 9–39)
BASOPHILS # BLD AUTO: 0.02 X10*3/UL (ref 0–0.1)
BASOPHILS NFR BLD AUTO: 0.2 %
BILIRUB SERPL-MCNC: 0.9 MG/DL (ref 0–1.2)
BUN SERPL-MCNC: 14 MG/DL (ref 6–23)
CALCIUM SERPL-MCNC: 9.5 MG/DL (ref 8.6–10.3)
CHLORIDE SERPL-SCNC: 105 MMOL/L (ref 98–107)
CO2 SERPL-SCNC: 24 MMOL/L (ref 21–32)
CREAT SERPL-MCNC: 0.9 MG/DL (ref 0.5–1.05)
EGFRCR SERPLBLD CKD-EPI 2021: 71 ML/MIN/1.73M*2
EOSINOPHIL # BLD AUTO: 0.1 X10*3/UL (ref 0–0.7)
EOSINOPHIL NFR BLD AUTO: 1 %
ERYTHROCYTE [DISTWIDTH] IN BLOOD BY AUTOMATED COUNT: 12.9 % (ref 11.5–14.5)
GLUCOSE SERPL-MCNC: 136 MG/DL (ref 74–99)
HCT VFR BLD AUTO: 35.8 % (ref 36–46)
HGB BLD-MCNC: 11.7 G/DL (ref 12–16)
HOLD SPECIMEN: NORMAL
IMM GRANULOCYTES # BLD AUTO: 0.07 X10*3/UL (ref 0–0.7)
IMM GRANULOCYTES NFR BLD AUTO: 0.7 % (ref 0–0.9)
LYMPHOCYTES # BLD AUTO: 1.3 X10*3/UL (ref 1.2–4.8)
LYMPHOCYTES NFR BLD AUTO: 12.8 %
MCH RBC QN AUTO: 28.6 PG (ref 26–34)
MCHC RBC AUTO-ENTMCNC: 32.7 G/DL (ref 32–36)
MCV RBC AUTO: 88 FL (ref 80–100)
MONOCYTES # BLD AUTO: 0.81 X10*3/UL (ref 0.1–1)
MONOCYTES NFR BLD AUTO: 8 %
NEUTROPHILS # BLD AUTO: 7.84 X10*3/UL (ref 1.2–7.7)
NEUTROPHILS NFR BLD AUTO: 77.3 %
NRBC BLD-RTO: 0 /100 WBCS (ref 0–0)
PLATELET # BLD AUTO: 227 X10*3/UL (ref 150–450)
POTASSIUM SERPL-SCNC: 3.5 MMOL/L (ref 3.5–5.3)
PROT SERPL-MCNC: 6.4 G/DL (ref 6.4–8.2)
RBC # BLD AUTO: 4.09 X10*6/UL (ref 4–5.2)
SODIUM SERPL-SCNC: 137 MMOL/L (ref 136–145)
WBC # BLD AUTO: 10.1 X10*3/UL (ref 4.4–11.3)

## 2024-12-04 PROCEDURE — 2500000001 HC RX 250 WO HCPCS SELF ADMINISTERED DRUGS (ALT 637 FOR MEDICARE OP): Performed by: INTERNAL MEDICINE

## 2024-12-04 PROCEDURE — 2500000002 HC RX 250 W HCPCS SELF ADMINISTERED DRUGS (ALT 637 FOR MEDICARE OP, ALT 636 FOR OP/ED): Performed by: INTERNAL MEDICINE

## 2024-12-04 PROCEDURE — 2500000004 HC RX 250 GENERAL PHARMACY W/ HCPCS (ALT 636 FOR OP/ED): Performed by: INTERNAL MEDICINE

## 2024-12-04 PROCEDURE — 99232 SBSQ HOSP IP/OBS MODERATE 35: CPT | Performed by: INTERNAL MEDICINE

## 2024-12-04 PROCEDURE — 90471 IMMUNIZATION ADMIN: CPT | Performed by: INTERNAL MEDICINE

## 2024-12-04 PROCEDURE — 36415 COLL VENOUS BLD VENIPUNCTURE: CPT | Performed by: INTERNAL MEDICINE

## 2024-12-04 PROCEDURE — 80053 COMPREHEN METABOLIC PANEL: CPT | Performed by: INTERNAL MEDICINE

## 2024-12-04 PROCEDURE — 90677 PCV20 VACCINE IM: CPT | Performed by: INTERNAL MEDICINE

## 2024-12-04 PROCEDURE — 85025 COMPLETE CBC W/AUTO DIFF WBC: CPT | Performed by: INTERNAL MEDICINE

## 2024-12-04 PROCEDURE — 1100000001 HC PRIVATE ROOM DAILY

## 2024-12-04 PROCEDURE — 99232 SBSQ HOSP IP/OBS MODERATE 35: CPT | Performed by: SURGERY

## 2024-12-04 RX ORDER — POTASSIUM CHLORIDE 20 MEQ/1
40 TABLET, EXTENDED RELEASE ORAL ONCE
Status: COMPLETED | OUTPATIENT
Start: 2024-12-04 | End: 2024-12-04

## 2024-12-04 ASSESSMENT — COGNITIVE AND FUNCTIONAL STATUS - GENERAL
MOBILITY SCORE: 24
DAILY ACTIVITIY SCORE: 24

## 2024-12-04 ASSESSMENT — PAIN SCALES - GENERAL: PAINLEVEL_OUTOF10: 0 - NO PAIN

## 2024-12-04 ASSESSMENT — PAIN - FUNCTIONAL ASSESSMENT: PAIN_FUNCTIONAL_ASSESSMENT: 0-10

## 2024-12-04 ASSESSMENT — ACTIVITIES OF DAILY LIVING (ADL): LACK_OF_TRANSPORTATION: NO

## 2024-12-04 NOTE — PROGRESS NOTES
Moni Riggs is a 64 y.o. female who was referred to the Clinical Pharmacy Team to complete a virtual Transitions of Care encounter for discharge medication optimization. The patient was referred for their COPD.    Attending: Kavon Juárez    PCP: Prince Cruz  _______________________________________________________________________  PHARMACY ASSESSMENT    Home Pharmacy: [facility]  Meds to beds? no    Affordability/Accessibility: Patient reports affordability of medications  Adherence/Organization: Patient reports adherence  Adverse Effects: Patient denies any adverse effects    No issues reported in regards to affordability, accessibility, adherence, organization, and adverse effects  _______________________________________________________________________  ASTHMA/COPD ASSESSMENT    CURRENT PHARMACOTHERAPY  - N/A  ___________________________________________________________________  PATIENT EDUCATION/GOALS  - Introduced post-discharge pharmacy team follow up and benefits of calls    - Patient reports not having issues paying or getting her medications from her pharmacy.    - Patient reports not being interested in program because she speaks to enough people about her health.     - Answered all patient questions and concerns to best of my ability  _______________________________________________________________________  RECOMMENDATIONS/PLAN  Patient declined clinical pharmacy team services    Patrice Baxter PharmD  PGY-1 Resident    Verbal consent to manage patient's drug therapy was obtained from the patient. They were informed they may decline to participate or withdraw from participation in pharmacy services at any time.

## 2024-12-04 NOTE — ED PROVIDER NOTES
HPI   Chief Complaint   Patient presents with    Abdominal Pain       63 yo F presents with abdominal pain beginning yesterday. She states the pain is mostly periumbilical and is severe. She states she took a laxative thinking it was gas, but felt no relief. She has been passing flatus and had a bowel movement earlier today that was fully formed. Her only abdominal surgery was a  many years prior. She denies fevers, vomiting, hematochezia, melena, dysuria, hematuria, flank pain, neck or back pain, chest pain, shortness of breath.            Patient History   Past Medical History:   Diagnosis Date    Personal history of other diseases of the circulatory system     History of hypertension    Polyp of corpus uteri 05/15/2014    Uterine polyp     Past Surgical History:   Procedure Laterality Date     SECTION, CLASSIC  05/15/2014     Section    COLONOSCOPY  05/15/2014    Complete Colonoscopy     Family History   Problem Relation Name Age of Onset    Other (Cardiac disorder) Mother      COPD Mother      Multiple myeloma Father      Hypertension Brother      Heart attack Mother's Sister      Hypertension Father's Sister      Hypertension Father's Brother      Heart attack Maternal Grandmother      Diabetes Other Uncle     Lung cancer Other Grandmother     Stroke Other Aunt      Social History     Tobacco Use    Smoking status: Never    Smokeless tobacco: Never   Substance Use Topics    Alcohol use: Not on file    Drug use: Not on file       Physical Exam   ED Triage Vitals [24 1335]   Temperature Heart Rate Respirations BP   36.6 °C (97.9 °F) 92 18 143/68      Pulse Ox Temp Source Heart Rate Source Patient Position   97 % Temporal Monitor Sitting      BP Location FiO2 (%)     Left arm --       Physical Exam  Cardiovascular:      Rate and Rhythm: Normal rate and regular rhythm.   Pulmonary:      Effort: Pulmonary effort is normal.      Breath sounds: Normal breath sounds.   Abdominal:       General: There is distension.      Palpations: Abdomen is soft.      Tenderness: There is generalized abdominal tenderness and tenderness in the right lower quadrant. There is guarding (Voluntary guarding RLQ). There is no right CVA tenderness, left CVA tenderness or rebound.   Skin:     General: Skin is warm and dry.   Neurological:      General: No focal deficit present.      Mental Status: She is alert.           ED Course & MDM   ED Course as of 24   Tue Dec 03, 2024   1658 General surgery has evaluated at bedside, recommend continuing IV antibiotics and admission to medicine. General surgery will follow along as consult.  [JG]      ED Course User Index  [JG] Torri Crain MD         Diagnoses as of 24   Sigmoid diverticulitis                 No data recorded     [place] Coma Scale Score: 15 (24 1344 : Sandeep Burt, RN)                           Medical Decision Making  65 yo F with generalized abdominal pain since yesterday. Has been passing flatus and having bowel movements. Only prior surgical history is  many years prior. She denies fevers, dysuria, hematuria, hematochezia, melena, flank pain. On exam, her vitals are stable. Her abdomen is slightly distended, soft throughout with diffuse tenderness worse in RLQ. Voluntary guarding in RLQ. No rebound. Nonperitoneal. CT is showing sigmoid diverticulitis with small pockets of extraluminal air. IV antibiotics given. Analgesia given. General surgery consulted and has evaluated patient at bedside, agree with IV antibiotics, will do serial abdominal exams, recommend admission to medicine. Patient admitted to [facility].        Procedure  Procedures     Torri Crain MD  24 1496

## 2024-12-04 NOTE — CARE PLAN
The patient's goals for the shift include advance diet    The clinical goals for the shift include Pt will report no GI symptoms this shift      Problem: Pain - Adult  Goal: Verbalizes/displays adequate comfort level or baseline comfort level  Outcome: Progressing     Problem: Safety - Adult  Goal: Free from fall injury  Outcome: Progressing     Problem: Discharge Planning  Goal: Discharge to home or other facility with appropriate resources  Outcome: Progressing     Problem: Chronic Conditions and Co-morbidities  Goal: Patient's chronic conditions and co-morbidity symptoms are monitored and maintained or improved  Outcome: Progressing

## 2024-12-04 NOTE — PROGRESS NOTES
"Moni Riggs is a 64 y.o. female on day 1 of admission presenting with Sigmoid diverticulitis.    Assessment/Plan   Acute sigmoid diverticulitis, first episode.  White blood cell count normal this morning and exam findings are reassuring.  We can advance her diet as tolerated.  Anticipate discharge home in the next 24 hours to complete her treatment with oral antibiotics.    Subjective   Patient feeling 100% better this morning.  This was her first episode of sigmoid diverticulitis.  Her last colonoscopy was about a year ago.       Objective     Physical Exam  NAD  A&Ox3  Non icteric  CTA  RR  Abdomen soft mild left lower quadrant tenderness  Extremities warm, well perfused     Last Recorded Vitals  Blood pressure 117/57, pulse 91, temperature 37.2 °C (99 °F), temperature source Oral, resp. rate 19, height 1.499 m (4' 11\"), weight 89.8 kg (198 lb), SpO2 95%.  Intake/Output last 3 Shifts:  I/O last 3 completed shifts:  In: 1000 (11.1 mL/kg) [IV Piggyback:1000]  Out: - (0 mL/kg)   Weight: 89.8 kg     Relevant Results    Scheduled medications  aspirin, 81 mg, oral, Daily  carvedilol, 25 mg, oral, BID  enoxaparin, 40 mg, subcutaneous, q24h  morphine, 4 mg, intravenous, Once  ondansetron, 4 mg, intravenous, Once  piperacillin-tazobactam, 3.375 g, intravenous, q6h  pneumoc 20-citlaly conj-dip cr(PF), 0.5 mL, intramuscular, During hospitalization  pravastatin, 80 mg, oral, Nightly      Continuous medications     PRN medications  PRN medications: acetaminophen **OR** acetaminophen **OR** acetaminophen, ondansetron **OR** ondansetron, traMADol    Results for orders placed or performed during the hospital encounter of 12/03/24 (from the past 24 hours)   CBC and Auto Differential   Result Value Ref Range    WBC 13.8 (H) 4.4 - 11.3 x10*3/uL    nRBC 0.0 0.0 - 0.0 /100 WBCs    RBC 4.69 4.00 - 5.20 x10*6/uL    Hemoglobin 13.3 12.0 - 16.0 g/dL    Hematocrit 41.1 36.0 - 46.0 %    MCV 88 80 - 100 fL    MCH 28.4 26.0 - 34.0 pg    " MCHC 32.4 32.0 - 36.0 g/dL    RDW 12.9 11.5 - 14.5 %    Platelets 249 150 - 450 x10*3/uL    Neutrophils % 81.6 40.0 - 80.0 %    Immature Granulocytes %, Automated 0.6 0.0 - 0.9 %    Lymphocytes % 11.3 13.0 - 44.0 %    Monocytes % 6.2 2.0 - 10.0 %    Eosinophils % 0.2 0.0 - 6.0 %    Basophils % 0.1 0.0 - 2.0 %    Neutrophils Absolute 11.22 (H) 1.20 - 7.70 x10*3/uL    Immature Granulocytes Absolute, Automated 0.08 0.00 - 0.70 x10*3/uL    Lymphocytes Absolute 1.55 1.20 - 4.80 x10*3/uL    Monocytes Absolute 0.85 0.10 - 1.00 x10*3/uL    Eosinophils Absolute 0.03 0.00 - 0.70 x10*3/uL    Basophils Absolute 0.02 0.00 - 0.10 x10*3/uL   Comprehensive Metabolic Panel   Result Value Ref Range    Glucose 145 (H) 74 - 99 mg/dL    Sodium 135 (L) 136 - 145 mmol/L    Potassium 3.8 3.5 - 5.3 mmol/L    Chloride 101 98 - 107 mmol/L    Bicarbonate 26 21 - 32 mmol/L    Anion Gap 12 10 - 20 mmol/L    Urea Nitrogen 16 6 - 23 mg/dL    Creatinine 0.99 0.50 - 1.05 mg/dL    eGFR 63 >60 mL/min/1.73m*2    Calcium 10.9 (H) 8.6 - 10.3 mg/dL    Albumin 4.0 3.4 - 5.0 g/dL    Alkaline Phosphatase 86 33 - 136 U/L    Total Protein 7.5 6.4 - 8.2 g/dL    AST 11 9 - 39 U/L    Bilirubin, Total 0.8 0.0 - 1.2 mg/dL    ALT 11 7 - 45 U/L   Troponin I, High Sensitivity   Result Value Ref Range    Troponin I, High Sensitivity 8 0 - 13 ng/L   Lipase   Result Value Ref Range    Lipase 12 9 - 82 U/L   B-type natriuretic peptide   Result Value Ref Range    BNP 41 0 - 99 pg/mL   ECG 12 Lead   Result Value Ref Range    Ventricular Rate 90 BPM    Atrial Rate 90 BPM    NY Interval 208 ms    QRS Duration 84 ms    QT Interval 344 ms    QTC Calculation(Bazett) 420 ms    P Axis 64 degrees    R Axis -32 degrees    T Axis 86 degrees    QRS Count 14 beats    Q Onset 227 ms    P Onset 123 ms    P Offset 180 ms    T Offset 399 ms    QTC Fredericia 393 ms   Urinalysis with Reflex Culture and Microscopic   Result Value Ref Range    Color, Urine Light-Orange (N) Light-Yellow,  Yellow, Dark-Yellow    Appearance, Urine Clear Clear    Specific Gravity, Urine >1.050 (N) 1.005 - 1.035    pH, Urine 5.5 5.0, 5.5, 6.0, 6.5, 7.0, 7.5, 8.0    Protein, Urine 50 (1+) (A) NEGATIVE, 10 (TRACE), 20 (TRACE) mg/dL    Glucose, Urine Normal Normal mg/dL    Blood, Urine 0.1 (1+) (A) NEGATIVE    Ketones, Urine TRACE (A) NEGATIVE mg/dL    Bilirubin, Urine NEGATIVE NEGATIVE    Urobilinogen, Urine 2 (1+) (A) Normal mg/dL    Nitrite, Urine NEGATIVE NEGATIVE    Leukocyte Esterase, Urine NEGATIVE NEGATIVE   Extra Urine Gray Tube   Result Value Ref Range    Extra Tube Hold for add-ons.    Urinalysis Microscopic   Result Value Ref Range    WBC, Urine 1-5 1-5, NONE /HPF    RBC, Urine 11-20 (A) NONE, 1-2, 3-5 /HPF    Squamous Epithelial Cells, Urine 1-9 (SPARSE) Reference range not established. /HPF    Bacteria, Urine 1+ (A) NONE SEEN /HPF    Mucus, Urine 4+ Reference range not established. /LPF   Lactate   Result Value Ref Range    Lactate 1.2 0.4 - 2.0 mmol/L   Blood Culture    Specimen: Peripheral Venipuncture; Blood culture   Result Value Ref Range    Blood Culture Loaded on Instrument - Culture in progress    Blood Culture    Specimen: Peripheral Venipuncture; Blood culture   Result Value Ref Range    Blood Culture Loaded on Instrument - Culture in progress    Comprehensive metabolic panel   Result Value Ref Range    Glucose 136 (H) 74 - 99 mg/dL    Sodium 137 136 - 145 mmol/L    Potassium 3.5 3.5 - 5.3 mmol/L    Chloride 105 98 - 107 mmol/L    Bicarbonate 24 21 - 32 mmol/L    Anion Gap 12 10 - 20 mmol/L    Urea Nitrogen 14 6 - 23 mg/dL    Creatinine 0.90 0.50 - 1.05 mg/dL    eGFR 71 >60 mL/min/1.73m*2    Calcium 9.5 8.6 - 10.3 mg/dL    Albumin 3.4 3.4 - 5.0 g/dL    Alkaline Phosphatase 83 33 - 136 U/L    Total Protein 6.4 6.4 - 8.2 g/dL    AST 13 9 - 39 U/L    Bilirubin, Total 0.9 0.0 - 1.2 mg/dL    ALT 10 7 - 45 U/L   CBC and Auto Differential   Result Value Ref Range    WBC 10.1 4.4 - 11.3 x10*3/uL    nRBC 0.0  0.0 - 0.0 /100 WBCs    RBC 4.09 4.00 - 5.20 x10*6/uL    Hemoglobin 11.7 (L) 12.0 - 16.0 g/dL    Hematocrit 35.8 (L) 36.0 - 46.0 %    MCV 88 80 - 100 fL    MCH 28.6 26.0 - 34.0 pg    MCHC 32.7 32.0 - 36.0 g/dL    RDW 12.9 11.5 - 14.5 %    Platelets 227 150 - 450 x10*3/uL    Neutrophils % 77.3 40.0 - 80.0 %    Immature Granulocytes %, Automated 0.7 0.0 - 0.9 %    Lymphocytes % 12.8 13.0 - 44.0 %    Monocytes % 8.0 2.0 - 10.0 %    Eosinophils % 1.0 0.0 - 6.0 %    Basophils % 0.2 0.0 - 2.0 %    Neutrophils Absolute 7.84 (H) 1.20 - 7.70 x10*3/uL    Immature Granulocytes Absolute, Automated 0.07 0.00 - 0.70 x10*3/uL    Lymphocytes Absolute 1.30 1.20 - 4.80 x10*3/uL    Monocytes Absolute 0.81 0.10 - 1.00 x10*3/uL    Eosinophils Absolute 0.10 0.00 - 0.70 x10*3/uL    Basophils Absolute 0.02 0.00 - 0.10 x10*3/uL           I spent 25 minutes in the professional and overall care of this patient.      Ashok Urbano MD

## 2024-12-04 NOTE — PROGRESS NOTES
12/04/24 1537   Discharge Planning   Living Arrangements Family members  (Granddaughter lives w/patient)   Support Systems Family members   Assistance Needed PTA; Independent w/ ADL's, no asst device   Type of Residence Private residence  (demo correct)   Number of Stairs to Enter Residence 1   Number of Stairs Within Residence 0   Home or Post Acute Services None   Expected Discharge Disposition Home  (patient denies any further needs)   Does the patient need discharge transport arranged? No  (patient's car is in the lot)   Financial Resource Strain   How hard is it for you to pay for the very basics like food, housing, medical care, and heating? Not very   Housing Stability   In the last 12 months, was there a time when you were not able to pay the mortgage or rent on time? N   In the past 12 months, how many times have you moved where you were living? 0   At any time in the past 12 months, were you homeless or living in a shelter (including now)? N   Transportation Needs   In the past 12 months, has lack of transportation kept you from medical appointments or from getting medications? no  (PCP listed; last office visit Nov 13, 2024)   In the past 12 months, has lack of transportation kept you from meetings, work, or from getting things needed for daily living? No   Intensity of Service   Intensity of Service 0-30 min     Care Coordinator Note:  Met patient at bedside to discuss discharge planning; explained my role as care coordinator  Plan: consulted by general surgery; Anticipate discharge home in the next 24 hours to complete her treatment with oral antibiotics.   Status: Inpatient d/t Sigmoid diverticulitis  Payor: Haley  Disposition: Home; no further needs anticipated   Tiffanie NINAN, RN [facility]

## 2024-12-04 NOTE — PROGRESS NOTES
"Moni Riggs is a 64 y.o. female on day 1 of admission presenting with Sigmoid diverticulitis.    Subjective   Patient seen and examined, significant overnight improvement, abdominal pain is decreased significantly and she did have 3 loose bowel movement this morning without any blood in it.  Seen by surgery and diet has been advanced as tolerated.  She has had no fever or chills.  Blood sugars are stable and blood pressures are stable.  Patient stated that she does not take lisinopril at home and she declined to take it stating that her blood pressures have always been decent at home.  No other new complaints.  Anticipate changing to oral antibiotics tomorrow and possible home tomorrow.       Objective     Physical Exam  GENERAL:  Alert, no distress, cooperative, overweight  SKIN:  Skin color, texture, turgor normal. No rashes or lesions.  OROPHARYNX:  Lips, mucosa, and tongue are normal.Teeth and gums, normal. Oropharynx normal.  NECK:  No jugulovenous distention, No carotid bruits, Carotid pulse normal contour, Supple  LUNGS:  Lungs clear to auscultation. Good diaphragmatic excursion.  CARDIAC: Rate and rhythm is regular, normal S1 and S2; no rubs, murmurs, or gallops  ABDOMEN:  Abdomen soft,  BS normal, left lower half and lower quadrant tenderness has improved significantly in the last 24 hours, which no guarding or rigidity, no masses or organomegaly  EXTREMETIES:  Extremities normal, no deformities, edema, clubbing or skin discoloration. Good capillary refill., No ulcers  NEURO:  Alert, oriented X 3, Gait normal. Non-focal. Reflexes normal and symmetric. Sensation grossly intact., Cranial nerves II-XII intact  PULSES:  2+ radial, 2+ carotid    Last Recorded Vitals  Blood pressure 117/57, pulse 91, temperature 37.2 °C (99 °F), temperature source Oral, resp. rate 19, height 1.499 m (4' 11\"), weight 89.8 kg (198 lb), SpO2 95%.  Intake/Output last 3 Shifts:  I/O last 3 completed shifts:  In: 1000 (11.1 " mL/kg) [IV Piggyback:1000]  Out: - (0 mL/kg)   Weight: 89.8 kg     Relevant Results  Scheduled medications  aspirin, 81 mg, oral, Daily  carvedilol, 25 mg, oral, BID  enoxaparin, 40 mg, subcutaneous, q24h  morphine, 4 mg, intravenous, Once  ondansetron, 4 mg, intravenous, Once  piperacillin-tazobactam, 3.375 g, intravenous, q6h  pneumoc 20-citlaly conj-dip cr(PF), 0.5 mL, intramuscular, During hospitalization  pravastatin, 80 mg, oral, Nightly      Continuous medications     PRN medications  PRN medications: acetaminophen **OR** acetaminophen **OR** acetaminophen, ondansetron **OR** ondansetron, traMADol       Results for orders placed or performed during the hospital encounter of 12/03/24 (from the past 96 hours)   CBC and Auto Differential   Result Value Ref Range    WBC 13.8 (H) 4.4 - 11.3 x10*3/uL    nRBC 0.0 0.0 - 0.0 /100 WBCs    RBC 4.69 4.00 - 5.20 x10*6/uL    Hemoglobin 13.3 12.0 - 16.0 g/dL    Hematocrit 41.1 36.0 - 46.0 %    MCV 88 80 - 100 fL    MCH 28.4 26.0 - 34.0 pg    MCHC 32.4 32.0 - 36.0 g/dL    RDW 12.9 11.5 - 14.5 %    Platelets 249 150 - 450 x10*3/uL    Neutrophils % 81.6 40.0 - 80.0 %    Immature Granulocytes %, Automated 0.6 0.0 - 0.9 %    Lymphocytes % 11.3 13.0 - 44.0 %    Monocytes % 6.2 2.0 - 10.0 %    Eosinophils % 0.2 0.0 - 6.0 %    Basophils % 0.1 0.0 - 2.0 %    Neutrophils Absolute 11.22 (H) 1.20 - 7.70 x10*3/uL    Immature Granulocytes Absolute, Automated 0.08 0.00 - 0.70 x10*3/uL    Lymphocytes Absolute 1.55 1.20 - 4.80 x10*3/uL    Monocytes Absolute 0.85 0.10 - 1.00 x10*3/uL    Eosinophils Absolute 0.03 0.00 - 0.70 x10*3/uL    Basophils Absolute 0.02 0.00 - 0.10 x10*3/uL   Comprehensive Metabolic Panel   Result Value Ref Range    Glucose 145 (H) 74 - 99 mg/dL    Sodium 135 (L) 136 - 145 mmol/L    Potassium 3.8 3.5 - 5.3 mmol/L    Chloride 101 98 - 107 mmol/L    Bicarbonate 26 21 - 32 mmol/L    Anion Gap 12 10 - 20 mmol/L    Urea Nitrogen 16 6 - 23 mg/dL    Creatinine 0.99 0.50 - 1.05  mg/dL    eGFR 63 >60 mL/min/1.73m*2    Calcium 10.9 (H) 8.6 - 10.3 mg/dL    Albumin 4.0 3.4 - 5.0 g/dL    Alkaline Phosphatase 86 33 - 136 U/L    Total Protein 7.5 6.4 - 8.2 g/dL    AST 11 9 - 39 U/L    Bilirubin, Total 0.8 0.0 - 1.2 mg/dL    ALT 11 7 - 45 U/L   Troponin I, High Sensitivity   Result Value Ref Range    Troponin I, High Sensitivity 8 0 - 13 ng/L   Lipase   Result Value Ref Range    Lipase 12 9 - 82 U/L   B-type natriuretic peptide   Result Value Ref Range    BNP 41 0 - 99 pg/mL   ECG 12 Lead   Result Value Ref Range    Ventricular Rate 90 BPM    Atrial Rate 90 BPM    WA Interval 208 ms    QRS Duration 84 ms    QT Interval 344 ms    QTC Calculation(Bazett) 420 ms    P Axis 64 degrees    R Axis -32 degrees    T Axis 86 degrees    QRS Count 14 beats    Q Onset 227 ms    P Onset 123 ms    P Offset 180 ms    T Offset 399 ms    QTC Fredericia 393 ms   Urinalysis with Reflex Culture and Microscopic   Result Value Ref Range    Color, Urine Light-Orange (N) Light-Yellow, Yellow, Dark-Yellow    Appearance, Urine Clear Clear    Specific Gravity, Urine >1.050 (N) 1.005 - 1.035    pH, Urine 5.5 5.0, 5.5, 6.0, 6.5, 7.0, 7.5, 8.0    Protein, Urine 50 (1+) (A) NEGATIVE, 10 (TRACE), 20 (TRACE) mg/dL    Glucose, Urine Normal Normal mg/dL    Blood, Urine 0.1 (1+) (A) NEGATIVE    Ketones, Urine TRACE (A) NEGATIVE mg/dL    Bilirubin, Urine NEGATIVE NEGATIVE    Urobilinogen, Urine 2 (1+) (A) Normal mg/dL    Nitrite, Urine NEGATIVE NEGATIVE    Leukocyte Esterase, Urine NEGATIVE NEGATIVE   Extra Urine Gray Tube   Result Value Ref Range    Extra Tube Hold for add-ons.    Urinalysis Microscopic   Result Value Ref Range    WBC, Urine 1-5 1-5, NONE /HPF    RBC, Urine 11-20 (A) NONE, 1-2, 3-5 /HPF    Squamous Epithelial Cells, Urine 1-9 (SPARSE) Reference range not established. /HPF    Bacteria, Urine 1+ (A) NONE SEEN /HPF    Mucus, Urine 4+ Reference range not established. /LPF   Lactate   Result Value Ref Range    Lactate 1.2  0.4 - 2.0 mmol/L   Blood Culture    Specimen: Peripheral Venipuncture; Blood culture   Result Value Ref Range    Blood Culture Loaded on Instrument - Culture in progress    Blood Culture    Specimen: Peripheral Venipuncture; Blood culture   Result Value Ref Range    Blood Culture Loaded on Instrument - Culture in progress    Comprehensive metabolic panel   Result Value Ref Range    Glucose 136 (H) 74 - 99 mg/dL    Sodium 137 136 - 145 mmol/L    Potassium 3.5 3.5 - 5.3 mmol/L    Chloride 105 98 - 107 mmol/L    Bicarbonate 24 21 - 32 mmol/L    Anion Gap 12 10 - 20 mmol/L    Urea Nitrogen 14 6 - 23 mg/dL    Creatinine 0.90 0.50 - 1.05 mg/dL    eGFR 71 >60 mL/min/1.73m*2    Calcium 9.5 8.6 - 10.3 mg/dL    Albumin 3.4 3.4 - 5.0 g/dL    Alkaline Phosphatase 83 33 - 136 U/L    Total Protein 6.4 6.4 - 8.2 g/dL    AST 13 9 - 39 U/L    Bilirubin, Total 0.9 0.0 - 1.2 mg/dL    ALT 10 7 - 45 U/L   CBC and Auto Differential   Result Value Ref Range    WBC 10.1 4.4 - 11.3 x10*3/uL    nRBC 0.0 0.0 - 0.0 /100 WBCs    RBC 4.09 4.00 - 5.20 x10*6/uL    Hemoglobin 11.7 (L) 12.0 - 16.0 g/dL    Hematocrit 35.8 (L) 36.0 - 46.0 %    MCV 88 80 - 100 fL    MCH 28.6 26.0 - 34.0 pg    MCHC 32.7 32.0 - 36.0 g/dL    RDW 12.9 11.5 - 14.5 %    Platelets 227 150 - 450 x10*3/uL    Neutrophils % 77.3 40.0 - 80.0 %    Immature Granulocytes %, Automated 0.7 0.0 - 0.9 %    Lymphocytes % 12.8 13.0 - 44.0 %    Monocytes % 8.0 2.0 - 10.0 %    Eosinophils % 1.0 0.0 - 6.0 %    Basophils % 0.2 0.0 - 2.0 %    Neutrophils Absolute 7.84 (H) 1.20 - 7.70 x10*3/uL    Immature Granulocytes Absolute, Automated 0.07 0.00 - 0.70 x10*3/uL    Lymphocytes Absolute 1.30 1.20 - 4.80 x10*3/uL    Monocytes Absolute 0.81 0.10 - 1.00 x10*3/uL    Eosinophils Absolute 0.10 0.00 - 0.70 x10*3/uL    Basophils Absolute 0.02 0.00 - 0.10 x10*3/uL    CT abdomen pelvis w IV contrast    Result Date: 12/3/2024  Interpreted By:  Allison Schneider, STUDY: CT ABDOMEN PELVIS W IV CONTRAST;   12/3/2024 3:25 pm   INDICATION: Signs/Symptoms:diffuse abd pain; distention.   COMPARISON: 04/24/2020   ACCESSION NUMBER(S): [id number]   ORDERING CLINICIAN: AMBROCIO HAWKINS   TECHNIQUE: CT of the abdomen and pelvis was performed following the intravenous administration 75 mL Omnipaque 350. Sagittal and coronal reconstructions were generated.   FINDINGS: LOWER CHEST: There are coronary artery calcifications.   ABDOMEN:   LIVER: Unremarkable   BILE DUCTS: Unremarkable   GALLBLADDER: No obvious gallstones.   PANCREAS: Unremarkable   SPLEEN: Unremarkable   ADRENAL GLANDS: There are no adrenal masses.   KIDNEYS AND URETERS: The kidneys are not obstructed. There are no obvious renal calculi.   The ureters are normal in caliber.   PELVIS:   BLADDER: Bladder is unremarkable.   REPRODUCTIVE ORGANS: There is slightly enlarged lobulated uterus consistent with fibroids.   BOWEL: There is no significant bowel distention. There are colonic diverticula.   There is thickening of the wall of sigmoid colon with surrounding edema and small extraluminal air collections consistent with acute diverticulitis. There is no discrete abscess.   There is a normal caliber appendix.   VESSELS: There are atherosclerotic changes of the aorta. The cava is unremarkable.   PERITONEUM/RETROPERITONEUM/LYMPH NODES: There are no large collections of extraluminal air. There is no significant peritoneal fluid.   There are numerous small pelvic lymph nodes.   BONES AND ABDOMINAL WALL: There are degenerative changes of the spine.   COMPARISON OF FINDINGS: The diverticulitis was not present previously.       Findings consistent with the sigmoid diverticulitis. There are small extraluminal air collections. There is inflammatory change around adjacent small bowels. A secure chest message was sent to Dr. Hawkins.   There are numerous small mesenteric and pelvic lymph nodes.   MACRO: none   Signed by: Allison Schneider 12/3/2024 4:07 PM Dictation workstation:    [id number]    ECG 12 Lead    Result Date: 12/3/2024  Normal sinus rhythm Left axis deviation Nonspecific T wave abnormality Abnormal ECG When compared with ECG of 03-FEB-2023 10:01, Premature atrial complexes are no longer Present T wave inversion less evident in Lateral leads                 Assessment/Plan   Assessment & Plan  Sigmoid diverticulitis  64-year-old lady with history of hypertension, overweight, hyperlipidemia presents with diffuse abdominal pain mostly left half of the abdomen and white cell count is 13,000 and a CT of the abdomen is consistent with acute diverticulitis with microperforation but no abscess formation.  Her other lab work revealed mildly elevated calcium of 10.9 with normal albumin of 4.0.  Patient did receive IV fluid overnight and repeat calcium is 9.5 today.     1.  Acute sigmoid diverticulitis  Significant improvement in abdominal pain and tenderness has resolved, she did have 3 bowel movements this morning without blood in it.  Seen by surgery and diet has been advanced as tolerated.  Continue IV antibiotics and pain medications and monitor closely.  Will switch to oral Augmentin tomorrow and discharge home.  Last colonoscopy was within the year.     2.  Hypertension resume home medications hold spironolactone and hydrochlorothiazide     3.  Hyperlipidemia continue with pravastatin     4.  Mild hypercalcemia with a serum calcium of 10.9 and serum albumin of 4.0, repeat serum calcium in the morning and check ionized calcium.  Repeat serum calcium today is 9.5.     Reviewed all labs and imaging studies, discussed the plan of treatment with patient in detail, surgical consult appreciated.          I spent 35 minutes in the professional and overall care of this patient.      Kavon Juárez MD

## 2024-12-05 ENCOUNTER — PHARMACY VISIT (OUTPATIENT)
Dept: PHARMACY | Facility: CLINIC | Age: 64
End: 2024-12-05
Payer: COMMERCIAL

## 2024-12-05 VITALS
SYSTOLIC BLOOD PRESSURE: 132 MMHG | DIASTOLIC BLOOD PRESSURE: 68 MMHG | TEMPERATURE: 98.1 F | HEIGHT: 59 IN | OXYGEN SATURATION: 96 % | HEART RATE: 85 BPM | BODY MASS INDEX: 39.92 KG/M2 | WEIGHT: 198 LBS | RESPIRATION RATE: 17 BRPM

## 2024-12-05 LAB
ANION GAP SERPL CALC-SCNC: 9 MMOL/L (ref 10–20)
BUN SERPL-MCNC: 11 MG/DL (ref 6–23)
CALCIUM SERPL-MCNC: 10.3 MG/DL (ref 8.6–10.3)
CHLORIDE SERPL-SCNC: 107 MMOL/L (ref 98–107)
CO2 SERPL-SCNC: 27 MMOL/L (ref 21–32)
CREAT SERPL-MCNC: 1.01 MG/DL (ref 0.5–1.05)
EGFRCR SERPLBLD CKD-EPI 2021: 62 ML/MIN/1.73M*2
ERYTHROCYTE [DISTWIDTH] IN BLOOD BY AUTOMATED COUNT: 13 % (ref 11.5–14.5)
GLUCOSE SERPL-MCNC: 124 MG/DL (ref 74–99)
HCT VFR BLD AUTO: 39 % (ref 36–46)
HGB BLD-MCNC: 12.2 G/DL (ref 12–16)
MCH RBC QN AUTO: 27.9 PG (ref 26–34)
MCHC RBC AUTO-ENTMCNC: 31.3 G/DL (ref 32–36)
MCV RBC AUTO: 89 FL (ref 80–100)
NRBC BLD-RTO: 0 /100 WBCS (ref 0–0)
PLATELET # BLD AUTO: 274 X10*3/UL (ref 150–450)
POTASSIUM SERPL-SCNC: 3.8 MMOL/L (ref 3.5–5.3)
RBC # BLD AUTO: 4.37 X10*6/UL (ref 4–5.2)
SODIUM SERPL-SCNC: 139 MMOL/L (ref 136–145)
WBC # BLD AUTO: 7.7 X10*3/UL (ref 4.4–11.3)

## 2024-12-05 PROCEDURE — 36415 COLL VENOUS BLD VENIPUNCTURE: CPT

## 2024-12-05 PROCEDURE — 99239 HOSP IP/OBS DSCHRG MGMT >30: CPT | Performed by: INTERNAL MEDICINE

## 2024-12-05 PROCEDURE — 2500000004 HC RX 250 GENERAL PHARMACY W/ HCPCS (ALT 636 FOR OP/ED): Performed by: INTERNAL MEDICINE

## 2024-12-05 PROCEDURE — 80048 BASIC METABOLIC PNL TOTAL CA: CPT

## 2024-12-05 PROCEDURE — 82374 ASSAY BLOOD CARBON DIOXIDE: CPT

## 2024-12-05 PROCEDURE — 85027 COMPLETE CBC AUTOMATED: CPT

## 2024-12-05 PROCEDURE — 2500000001 HC RX 250 WO HCPCS SELF ADMINISTERED DRUGS (ALT 637 FOR MEDICARE OP): Performed by: INTERNAL MEDICINE

## 2024-12-05 PROCEDURE — 2500000002 HC RX 250 W HCPCS SELF ADMINISTERED DRUGS (ALT 637 FOR MEDICARE OP, ALT 636 FOR OP/ED): Performed by: INTERNAL MEDICINE

## 2024-12-05 PROCEDURE — RXMED WILLOW AMBULATORY MEDICATION CHARGE

## 2024-12-05 RX ORDER — SPIRONOLACTONE 25 MG/1
25 TABLET ORAL DAILY
Status: DISCONTINUED | OUTPATIENT
Start: 2024-12-05 | End: 2024-12-05 | Stop reason: HOSPADM

## 2024-12-05 RX ORDER — AMOXICILLIN AND CLAVULANATE POTASSIUM 875; 125 MG/1; MG/1
1 TABLET, FILM COATED ORAL 2 TIMES DAILY
Qty: 14 TABLET | Refills: 0 | Status: SHIPPED | OUTPATIENT
Start: 2024-12-05 | End: 2024-12-12

## 2024-12-05 RX ORDER — HYDROCHLOROTHIAZIDE 25 MG/1
25 TABLET ORAL DAILY
Status: DISCONTINUED | OUTPATIENT
Start: 2024-12-05 | End: 2024-12-05 | Stop reason: HOSPADM

## 2024-12-05 ASSESSMENT — PAIN SCALES - GENERAL
PAINLEVEL_OUTOF10: 0 - NO PAIN

## 2024-12-05 ASSESSMENT — COGNITIVE AND FUNCTIONAL STATUS - GENERAL
DAILY ACTIVITIY SCORE: 24
MOBILITY SCORE: 24

## 2024-12-05 ASSESSMENT — PAIN - FUNCTIONAL ASSESSMENT
PAIN_FUNCTIONAL_ASSESSMENT: 0-10
PAIN_FUNCTIONAL_ASSESSMENT: 0-10

## 2024-12-05 NOTE — DISCHARGE SUMMARY
ADMISSION DATE: 12/3/2024     DISCHARGE DATE:  12/05/24     Discharge Diagnosis  Sigmoid diverticulitis  Hypertension  Hyperlipidemia    Issues Requiring Follow-Up  Discharged home to be followed up by PCP.        Discharge Meds     Your medication list        START taking these medications        Instructions Last Dose Given Next Dose Due   amoxicillin-pot clavulanate 875-125 mg tablet  Commonly known as: Augmentin      Take 1 tablet by mouth 2 times a day for 7 days.              CONTINUE taking these medications        Instructions Last Dose Given Next Dose Due   aspirin 81 mg EC tablet      Take 1 tablet (81 mg) by mouth once daily. Take with food.       carvedilol 25 mg tablet  Commonly known as: Coreg      Take 1 tablet (25 mg) by mouth 2 times a day with meals.       pravastatin 80 mg tablet  Commonly known as: Pravachol      Take 1 tablet (80 mg) by mouth once daily.       spironolacton-hydrochlorothiaz 25-25 mg tablet  Commonly known as: Aldactazide      Take 1 tablet (25 mg) by mouth once daily.              STOP taking these medications      cod liver oiL oil                  Where to Get Your Medications        These medications were sent to [facility]  [address], [place] [address] [address]      Hours: 8 AM to 6 PM Mon-Fri, 9 AM to 1 PM Saturday Phone: [phone]   amoxicillin-pot clavulanate 875-125 mg tablet             Results for orders placed or performed during the hospital encounter of 12/03/24 (from the past 24 hours)   CBC   Result Value Ref Range    WBC 7.7 4.4 - 11.3 x10*3/uL    nRBC 0.0 0.0 - 0.0 /100 WBCs    RBC 4.37 4.00 - 5.20 x10*6/uL    Hemoglobin 12.2 12.0 - 16.0 g/dL    Hematocrit 39.0 36.0 - 46.0 %    MCV 89 80 - 100 fL    MCH 27.9 26.0 - 34.0 pg    MCHC 31.3 (L) 32.0 - 36.0 g/dL    RDW 13.0 11.5 - 14.5 %    Platelets 274 150 - 450 x10*3/uL   Basic Metabolic Panel   Result Value Ref Range    Glucose 124 (H) 74 - 99 mg/dL    Sodium 139 136 - 145 mmol/L     Potassium 3.8 3.5 - 5.3 mmol/L    Chloride 107 98 - 107 mmol/L    Bicarbonate 27 21 - 32 mmol/L    Anion Gap 9 (L) 10 - 20 mmol/L    Urea Nitrogen 11 6 - 23 mg/dL    Creatinine 1.01 0.50 - 1.05 mg/dL    eGFR 62 >60 mL/min/1.73m*2    Calcium 10.3 8.6 - 10.3 mg/dL    ECG 12 Lead    Result Date: 12/7/2024  Normal sinus rhythm Left axis deviation Nonspecific T wave abnormality Abnormal ECG When compared with ECG of 03-FEB-2023 10:01, Premature atrial complexes are no longer Present T wave inversion less evident in Lateral leads See ED provider note for full interpretation and clinical correlation Confirmed by Anu Spain ([id number]) on 12/7/2024 3:47:06 PM    CT abdomen pelvis w IV contrast    Result Date: 12/3/2024  Interpreted By:  Allison Schneider, STUDY: CT ABDOMEN PELVIS W IV CONTRAST;  12/3/2024 3:25 pm   INDICATION: Signs/Symptoms:diffuse abd pain; distention.   COMPARISON: 04/24/2020   ACCESSION NUMBER(S): [id number]   ORDERING CLINICIAN: AMBROCIO HAWKINS   TECHNIQUE: CT of the abdomen and pelvis was performed following the intravenous administration 75 mL Omnipaque 350. Sagittal and coronal reconstructions were generated.   FINDINGS: LOWER CHEST: There are coronary artery calcifications.   ABDOMEN:   LIVER: Unremarkable   BILE DUCTS: Unremarkable   GALLBLADDER: No obvious gallstones.   PANCREAS: Unremarkable   SPLEEN: Unremarkable   ADRENAL GLANDS: There are no adrenal masses.   KIDNEYS AND URETERS: The kidneys are not obstructed. There are no obvious renal calculi.   The ureters are normal in caliber.   PELVIS:   BLADDER: Bladder is unremarkable.   REPRODUCTIVE ORGANS: There is slightly enlarged lobulated uterus consistent with fibroids.   BOWEL: There is no significant bowel distention. There are colonic diverticula.   There is thickening of the wall of sigmoid colon with surrounding edema and small extraluminal air collections consistent with acute diverticulitis. There is no discrete abscess.   There is a normal  caliber appendix.   VESSELS: There are atherosclerotic changes of the aorta. The cava is unremarkable.   PERITONEUM/RETROPERITONEUM/LYMPH NODES: There are no large collections of extraluminal air. There is no significant peritoneal fluid.   There are numerous small pelvic lymph nodes.   BONES AND ABDOMINAL WALL: There are degenerative changes of the spine.   COMPARISON OF FINDINGS: The diverticulitis was not present previously.       Findings consistent with the sigmoid diverticulitis. There are small extraluminal air collections. There is inflammatory change around adjacent small bowels. A secure chest message was sent to Dr. Finn.   There are numerous small mesenteric and pelvic lymph nodes.   MACRO: none   Signed by: Allison Schneider 12/3/2024 4:07 PM Dictation workstation:   [id number]          Hospital Course   Sigmoid diverticulitis  64-year-old lady with history of hypertension, overweight, hyperlipidemia presents with diffuse abdominal pain mostly left half of the abdomen and white cell count is 13,000 and a CT of the abdomen is consistent with acute diverticulitis with microperforation but no abscess formation.  Her other lab work revealed mildly elevated calcium of 10.9 with normal albumin of 4.0.  Patient did receive IV fluid overnight and repeat calcium is 9.5 t     1.  Acute sigmoid diverticulitis  Significant improvement in abdominal pain and tenderness has resolved, she did have 3 bowel movements this morning without blood in it.  Seen by surgery and diet has been advanced as tolerated.  Received  IV antibiotics and pain medications  Switched to  oral Augmentin today and discharge home.  Last colonoscopy was within the year.     2.  Hypertension resume home medications hold spironolactone and hydrochlorothiazide     3.  Hyperlipidemia continue with pravastatin     4.  Mild hypercalcemia with a serum calcium of 10.9 and serum albumin of 4.0, repeat serum calcium in the morning and check ionized  "calcium.  Repeat serum calcium today is 9.5.     Reviewed all labs and imaging studies, discussed the plan of treatment with patient in detail, surgical consult appreciated.        I spent 35 minutes in the professional and overall care of this patient.           Pertinent Physical Exam At Time of Discharge  Visit Vitals  /68 (BP Location: Right arm, Patient Position: Lying)   Pulse 85   Temp 36.7 °C (98.1 °F) (Temporal)   Resp 17   Ht 1.499 m (4' 11\")   Wt 89.8 kg (198 lb)   SpO2 96%   BMI 39.99 kg/m²   OB Status Postmenopausal   Smoking Status Never   BSA 1.93 m²    GENERAL:  Alert, no distress, cooperative, overweight  SKIN:  Skin color, texture, turgor normal. No rashes or lesions.  OROPHARYNX:  Lips, mucosa, and tongue are normal.Teeth and gums, normal. Oropharynx normal.  NECK:  No jugulovenous distention, No carotid bruits, Carotid pulse normal contour, Supple  LUNGS:  Lungs clear to auscultation. Good diaphragmatic excursion.  CARDIAC: Rate and rhythm is regular, normal S1 and S2; no rubs, murmurs, or gallops  ABDOMEN:  Abdomen soft,  BS normal, left lower half and lower quadrant tenderness has improved significantly in the last 48 hours, with no guarding or rigidity, no masses or organomegaly  EXTREMETIES:  Extremities normal, no deformities, edema, clubbing or skin discoloration. Good capillary refill., No ulcers  NEURO:  Alert, oriented X 3, Gait normal. Non-focal. Reflexes normal and symmetric. Sensation grossly intact., Cranial nerves II-XII intact  PULSES:  2+ radial, 2+ carotid     Last Recorded Vitals  Blood pressure 117/57, pulse 91, temperature 37.2 °C (99 °F), temperature source Oral, resp. rate 19, height 1.499 m (4' 11\"), weight 89.8 kg (198 lb), SpO2 95%.  Intake/Output last 3 Shifts:  I/O last 3 completed shifts:  In: 1000 (11.1 mL/kg) [IV Piggyback:1000]  Out: - (0 mL/kg)   Weight: 89.8 kg         Outpatient Follow-Up  No future appointments.      Kavon Juárez MD  "

## 2024-12-05 NOTE — CARE PLAN
The patient's goals for the shift include      The clinical goals for the shift include patient will report gi systems    Over the shift, the patient did not make progress toward the following goals. Barriers to progression include . Recommendations to address these barriers include   Problem: Safety - Adult  Goal: Free from fall injury  Outcome: Progressing     Problem: Discharge Planning  Goal: Discharge to home or other facility with appropriate resources  Outcome: Progressing   .

## 2024-12-05 NOTE — ASSESSMENT & PLAN NOTE
64-year-old lady with history of hypertension, overweight, hyperlipidemia presents with diffuse abdominal pain mostly left half of the abdomen and white cell count is 13,000 and a CT of the abdomen is consistent with acute diverticulitis with microperforation but no abscess formation.  Her other lab work revealed mildly elevated calcium of 10.9 with normal albumin of 4.0.  Patient did receive IV fluid overnight and repeat calcium is 9.5 today.     1.  Acute sigmoid diverticulitis  Significant improvement in abdominal pain and tenderness has resolved, she did have 3 bowel movements this morning without blood in it.  Seen by surgery and diet has been advanced as tolerated.  Continue IV antibiotics and pain medications and monitor closely.  Will switch to oral Augmentin tomorrow and discharge home.  Last colonoscopy was within the year.     2.  Hypertension resume home medications hold spironolactone and hydrochlorothiazide     3.  Hyperlipidemia continue with pravastatin     4.  Mild hypercalcemia with a serum calcium of 10.9 and serum albumin of 4.0, repeat serum calcium in the morning and check ionized calcium.  Repeat serum calcium today is 9.5.     Reviewed all labs and imaging studies, discussed the plan of treatment with patient in detail, surgical consult appreciated.

## 2024-12-05 NOTE — CARE PLAN
The patient's goals for the shift include  discharge    The clinical goals for the shift include Pt will be report no n/v/d this shift      Problem: Pain - Adult  Goal: Verbalizes/displays adequate comfort level or baseline comfort level  Outcome: Progressing     Problem: Safety - Adult  Goal: Free from fall injury  Outcome: Progressing     Problem: Discharge Planning  Goal: Discharge to home or other facility with appropriate resources  Outcome: Progressing     Problem: Chronic Conditions and Co-morbidities  Goal: Patient's chronic conditions and co-morbidity symptoms are monitored and maintained or improved  Outcome: Progressing

## 2024-12-05 NOTE — DISCHARGE INSTRUCTIONS
[facility] Observation Unit Discharge Instructions  You came to the hospital with abdominal pain and CT scan of the abdomen showed sigmoid colon acute diverticulitis and were admitted for observation and further care.   You were treated with IVAB.   A general surgery saw you while admitted and helped manage your care.   Your discharge plan is to go home to recover on with oral Augmentin x 7 days.    Please see your primary care doctor in 1 week for follow-up.   An appointment has been requested for you but may you need to call your doctors office to schedule.    Additionally, a referral has been ordered for you to follow up with general surgery and PCP.   The office or scheduling department should call you to arrange an appointment in the next week or two.     For any issues or concerns with appointments, call the  scheduling line at [phone] or the providers office directly.       See the attached information for education about any new medications and the condition(s) you were treated for.  Your medications may have changed so pay close attention to the list given to you at discharge and ask any questions you have before leaving the hospital.

## 2024-12-07 LAB
ATRIAL RATE: 90 BPM
BACTERIA BLD CULT: NORMAL
BACTERIA BLD CULT: NORMAL
P AXIS: 64 DEGREES
P OFFSET: 180 MS
P ONSET: 123 MS
PR INTERVAL: 208 MS
Q ONSET: 227 MS
QRS COUNT: 14 BEATS
QRS DURATION: 84 MS
QT INTERVAL: 344 MS
QTC CALCULATION(BAZETT): 420 MS
QTC FREDERICIA: 393 MS
R AXIS: -32 DEGREES
T AXIS: 86 DEGREES
T OFFSET: 399 MS
VENTRICULAR RATE: 90 BPM

## 2024-12-18 ENCOUNTER — APPOINTMENT (OUTPATIENT)
Dept: PRIMARY CARE | Facility: CLINIC | Age: 64
End: 2024-12-18
Payer: COMMERCIAL

## 2024-12-18 VITALS — DIASTOLIC BLOOD PRESSURE: 68 MMHG | SYSTOLIC BLOOD PRESSURE: 123 MMHG

## 2024-12-18 DIAGNOSIS — R91.1 LUNG NODULE: Primary | ICD-10-CM

## 2024-12-18 DIAGNOSIS — I10 PRIMARY HYPERTENSION: ICD-10-CM

## 2024-12-18 DIAGNOSIS — K57.32 SIGMOID DIVERTICULITIS: ICD-10-CM

## 2024-12-18 PROCEDURE — 1111F DSCHRG MED/CURRENT MED MERGE: CPT | Performed by: INTERNAL MEDICINE

## 2024-12-18 PROCEDURE — 3078F DIAST BP <80 MM HG: CPT | Performed by: INTERNAL MEDICINE

## 2024-12-18 PROCEDURE — 99213 OFFICE O/P EST LOW 20 MIN: CPT | Performed by: INTERNAL MEDICINE

## 2024-12-18 PROCEDURE — 3074F SYST BP LT 130 MM HG: CPT | Performed by: INTERNAL MEDICINE

## 2024-12-18 PROCEDURE — 1159F MED LIST DOCD IN RCRD: CPT | Performed by: INTERNAL MEDICINE

## 2024-12-18 NOTE — PROGRESS NOTES
Subjective   Patient ID: Moni Riggs is a 64 y.o. female who presents for Follow-up.    HPI follow-up visit status post diverticulitis visit in the hospital ER given antibiotics did okay afterwards not much in the way of diarrhea status post antibiotics has changed her diet more drastically this time including letting go of all nuts no abdominal pain no nausea no chest pain no shortness of breath no fever    Review of Systems    Objective   There were no vitals taken for this visit.    Physical Exam  Vital signs noted alert and oriented x 3 NCAT no JVD chest clear to auscultation CV regular rate and rhythm S1-S2 no murmur gallop or rub abdomen soft obese nontender normal active bowel sounds LS spine normal curvature negative straight leg raise extremities no clubbing cyanosis or edema normal distal pulses  Assessment/Plan        Impression hypertension diverticulitis other diagnoses lung nodule  Plan her previous CT scan of the abdomen pelvis was reviewed but also her previous scan from spring 2020 for lung nodule follow-up non-smoker requisition made she will schedule her own appointment diet weight loss exercise diverticular diet increase water consumption increase fiber consumption continue with blood pressure blood pressure medicine and then recheck for regular visit 3 months and/or sooner as needed

## 2025-01-15 ENCOUNTER — HOSPITAL ENCOUNTER (OUTPATIENT)
Dept: RADIOLOGY | Facility: CLINIC | Age: 65
Discharge: HOME | End: 2025-01-15
Payer: COMMERCIAL

## 2025-01-15 DIAGNOSIS — R91.1 LUNG NODULE: ICD-10-CM

## 2025-01-15 PROCEDURE — 71250 CT THORAX DX C-: CPT | Performed by: RADIOLOGY

## 2025-01-15 PROCEDURE — 71250 CT THORAX DX C-: CPT

## 2025-01-27 ENCOUNTER — APPOINTMENT (OUTPATIENT)
Dept: SURGERY | Facility: CLINIC | Age: 65
End: 2025-01-27
Payer: COMMERCIAL

## 2025-02-06 ENCOUNTER — TELEPHONE (OUTPATIENT)
Dept: CARDIOLOGY | Facility: HOSPITAL | Age: 65
End: 2025-02-06

## 2025-02-13 PROBLEM — Z86.79 HISTORY OF HYPERTENSION: Status: ACTIVE | Noted: 2025-02-13

## 2025-02-13 PROBLEM — E87.1 HYPONATREMIA: Status: ACTIVE | Noted: 2024-02-22

## 2025-02-14 ENCOUNTER — OFFICE VISIT (OUTPATIENT)
Dept: CARDIOLOGY | Facility: CLINIC | Age: 65
End: 2025-02-14
Payer: COMMERCIAL

## 2025-02-14 VITALS
DIASTOLIC BLOOD PRESSURE: 66 MMHG | HEIGHT: 59 IN | SYSTOLIC BLOOD PRESSURE: 112 MMHG | WEIGHT: 200.5 LBS | HEART RATE: 73 BPM | BODY MASS INDEX: 40.42 KG/M2 | OXYGEN SATURATION: 96 %

## 2025-02-14 DIAGNOSIS — I65.29 STENOSIS OF CAROTID ARTERY, UNSPECIFIED LATERALITY: ICD-10-CM

## 2025-02-14 PROCEDURE — 3008F BODY MASS INDEX DOCD: CPT | Performed by: INTERNAL MEDICINE

## 2025-02-14 PROCEDURE — 99214 OFFICE O/P EST MOD 30 MIN: CPT | Performed by: INTERNAL MEDICINE

## 2025-02-14 PROCEDURE — 1159F MED LIST DOCD IN RCRD: CPT | Performed by: INTERNAL MEDICINE

## 2025-02-14 PROCEDURE — 3074F SYST BP LT 130 MM HG: CPT | Performed by: INTERNAL MEDICINE

## 2025-02-14 PROCEDURE — 1126F AMNT PAIN NOTED NONE PRSNT: CPT | Performed by: INTERNAL MEDICINE

## 2025-02-14 PROCEDURE — 3078F DIAST BP <80 MM HG: CPT | Performed by: INTERNAL MEDICINE

## 2025-02-14 PROCEDURE — 1036F TOBACCO NON-USER: CPT | Performed by: INTERNAL MEDICINE

## 2025-02-14 RX ORDER — ASPIRIN 81 MG/1
81 TABLET ORAL DAILY
Qty: 90 TABLET | Refills: 3 | Status: SHIPPED | OUTPATIENT
Start: 2025-02-14

## 2025-02-14 ASSESSMENT — ENCOUNTER SYMPTOMS
HEMATURIA: 0
LOSS OF SENSATION IN FEET: 0
HEADACHES: 0
HEMOPTYSIS: 0
OCCASIONAL FEELINGS OF UNSTEADINESS: 0
CHILLS: 0
VOMITING: 0
ALTERED MENTAL STATUS: 0
NAUSEA: 0
WHEEZING: 0
DYSURIA: 0
COUGH: 0
ABDOMINAL PAIN: 0
BLOATING: 0
FEVER: 0
FALLS: 0
DIARRHEA: 0
DEPRESSION: 0
CONSTIPATION: 0
MYALGIAS: 0
MEMORY LOSS: 0

## 2025-02-14 ASSESSMENT — PAIN SCALES - GENERAL: PAINLEVEL_OUTOF10: 0-NO PAIN

## 2025-02-14 ASSESSMENT — COLUMBIA-SUICIDE SEVERITY RATING SCALE - C-SSRS
1. IN THE PAST MONTH, HAVE YOU WISHED YOU WERE DEAD OR WISHED YOU COULD GO TO SLEEP AND NOT WAKE UP?: NO
6. HAVE YOU EVER DONE ANYTHING, STARTED TO DO ANYTHING, OR PREPARED TO DO ANYTHING TO END YOUR LIFE?: NO
2. HAVE YOU ACTUALLY HAD ANY THOUGHTS OF KILLING YOURSELF?: NO

## 2025-02-14 ASSESSMENT — PATIENT HEALTH QUESTIONNAIRE - PHQ9
2. FEELING DOWN, DEPRESSED OR HOPELESS: NOT AT ALL
1. LITTLE INTEREST OR PLEASURE IN DOING THINGS: NOT AT ALL
SUM OF ALL RESPONSES TO PHQ9 QUESTIONS 1 AND 2: 0

## 2025-02-14 NOTE — PROGRESS NOTES
Chief Complaint   Patient presents with    Follow-up    Hypertension       HPI  63 yo BF w/ h/o ?HFpEF, CAC, carotid stenosis, HTN, HPL, LAMBERTO (intol CPAP), TOB (quit) now here for cardiology f/u.   No chest pain. No dyspnea at rest. +occ BRANTLEY (mod exertion). No orthopnea/PND.. No palps. No LH/dizziness/syncope. +occ LE edema, less on diuretic. No claudication. No cough. No further L pinky numb.  BP at home: 120s/70s  ECG 11/17: SR (79), 1st deg AVB, antlat TWIs  ECG 5/19: SR (74), PACs, 1st deg AVB, ?SMI, lat TWIs  ECG 2/23: SR (79), PACs, 1st deg AVB, lat TWIs  ECG 12/24: SR (90), nonsp T-wave changes  Echo 5/11: EF >65%, DD, THIERRY w/ mild LVOT obstruction  Echo 7/16: EF 70-75%, conc remod, pseudonl/DD, nl LAD, pASP 40  ETT 8/16: no ischemia  CCS 4/23: 215 (LM 0, , Lcx 22, RCA 60), nl heart size, no peric eff, AsAo 2.5cm, 6mm LESA nodule  CTA chest 2/24: no PE, 6mm LESA nodule  CT hcest 4/24: mild-mod CAC, nl heart size, no peric eff, mild-mod AA, no AAA, 7mm LESA nodule  CT chest 1/25: nl heart size, no peric eff, AA, no aneurysm, 7mm LESA nodule  CT ab 4/20: no AAA  CT ab 12/24: CAC, AA  Carotid US 9/08: B plaque, EDITH 40-59%, LICA <40%  Carotid US 9/09: B plaque, EDITH 60-79%, LICA 40-59%   Carotid US 2/24: L 50-69%, R no HDSS    Review of Systems   Constitutional: Negative for chills, fever and malaise/fatigue.   HENT:  Negative for hearing loss.    Eyes:  Negative for visual disturbance.   Respiratory:  Negative for cough, hemoptysis and wheezing.    Skin:  Negative for rash.   Musculoskeletal:  Negative for falls and myalgias.   Gastrointestinal:  Negative for bloating, abdominal pain, constipation, diarrhea, dysphagia, nausea and vomiting.   Genitourinary:  Negative for dysuria and hematuria.   Neurological:  Negative for headaches.   Psychiatric/Behavioral:  Negative for altered mental status, depression and memory loss.       Social History     Tobacco Use    Smoking status: Never    Smokeless tobacco: Never    Substance Use Topics    Alcohol use: Yes      Family History   Problem Relation Name Age of Onset    Other (Cardiac disorder) Mother      COPD Mother      Multiple myeloma Father      Hypertension Brother      Heart attack Mother's Sister      Hypertension Father's Sister      Hypertension Father's Brother      Heart attack Maternal Grandmother      Diabetes Other Uncle     Lung cancer Other Grandmother     Stroke Other Aunt       Allergies   Allergen Reactions    Lisinopril Unknown      Current Outpatient Medications   Medication Instructions    aspirin 81 mg, oral, Daily, Take with food.    carvedilol (Coreg) 25 mg tablet TAKE 1 TABLET(25 MG) BY MOUTH TWICE DAILY WITH MEALS    pravastatin (Pravachol) 80 mg tablet TAKE 1 TABLET(80 MG) BY MOUTH ONCE DAILY    spironolacton-hydrochlorothiaz (Aldactazide) 25-25 mg tablet 25 mg, oral, Daily      Vitals:    02/14/25 1447   BP: 112/66   Pulse: 73   SpO2: 96%      Physical Exam  Constitutional:       Appearance: Normal appearance.   HENT:      Head: Normocephalic and atraumatic.      Nose: Nose normal.   Neck:      Vascular: Carotid bruit present.   Cardiovascular:      Rate and Rhythm: Normal rate and regular rhythm.      Heart sounds: Murmur heard.      Systolic murmur is present with a grade of 1/6.      Comments: Trivial LE edema  Pulmonary:      Effort: Pulmonary effort is normal.      Breath sounds: Normal breath sounds.   Abdominal:      Palpations: Abdomen is soft.      Tenderness: There is no abdominal tenderness.   Musculoskeletal:      Right lower leg: Edema present.      Left lower leg: Edema present.   Skin:     General: Skin is warm and dry.   Neurological:      General: No focal deficit present.      Mental Status: She is alert.   Psychiatric:         Mood and Affect: Mood normal.         Judgment: Judgment normal.        Results/Data  12/24 Cr 1.01, K 3.8, HGB 12.2, , HSTPN 8,   5/24 LDL 90, HDL 34, , Chol 147  8/23 Cr 1.26, K 4.3, LDL  90, HGB 14.9, TSH 1.66  6/22 Cr 1.06, K 4.1, hgba1c 6.3  11/21 Cr 0.99, K 4.7, LFT nl, LDL 84, HDL 30, , Chol 146, HGB 13.8, , hgba1c 6.9, TSH 1.38  4/20 Cr 0.72, K 4.5  9/19 hgba1c 6.1  5/19 Cr 0.94, K 4.4, BNP 22  9/18 Cr 0.7, K 3.8, LFT nl, LDL 78, HDL 31, , Chol 137, HGB 13.7, , hgba1c 6.3, TSH 1.7  7/16 BNP 4     Assessment/Plan   65 yo BF w/ h/o ?HFpEF, CAC, carotid stenosis, HTN, HPL, LAMBERTO (intol CPAP), TOB (quit). Doing well. No sig symptoms.  -continue ASA 81 qd (JAMES)  -continue Carvedilol 25 bid  -continue Leary-HCTZ 25-25 qd (can increase if needed for edema/HTN)  -continue Prava 80 qhs (she defers change to other statin), consider add Zetia if patient agreeable -> goal LDL at least <100, optimal <70 (JAMES)  -f/u 1 year (earlier if needed)     Jaleel Mayfield MD

## 2025-02-20 ENCOUNTER — OFFICE VISIT (OUTPATIENT)
Dept: PRIMARY CARE | Facility: CLINIC | Age: 65
End: 2025-02-20
Payer: COMMERCIAL

## 2025-02-20 VITALS
OXYGEN SATURATION: 97 % | DIASTOLIC BLOOD PRESSURE: 74 MMHG | RESPIRATION RATE: 12 BRPM | HEART RATE: 91 BPM | SYSTOLIC BLOOD PRESSURE: 126 MMHG

## 2025-02-20 DIAGNOSIS — I10 PRIMARY HYPERTENSION: ICD-10-CM

## 2025-02-20 DIAGNOSIS — Z00.00 HEALTH CARE MAINTENANCE: Primary | ICD-10-CM

## 2025-02-20 DIAGNOSIS — B96.89 ACUTE BACTERIAL BRONCHITIS: ICD-10-CM

## 2025-02-20 DIAGNOSIS — J20.8 ACUTE BACTERIAL BRONCHITIS: ICD-10-CM

## 2025-02-20 RX ORDER — AZITHROMYCIN 250 MG/1
TABLET, FILM COATED ORAL
Qty: 6 TABLET | Refills: 0 | Status: SHIPPED | OUTPATIENT
Start: 2025-02-20 | End: 2025-02-25

## 2025-02-20 NOTE — PROGRESS NOTES
Subjective   Patient ID: Moni Riggs is a 64 y.o. female who presents for Follow-up (Common cold).    HPI follow-up visit and sick visit of 30 minutes late for appointment no chest pain no shortness of breath but has had fever and cough and cold symptoms for the past 1 week has tried multiple over-the-counter products without success no bowel issues    Review of Systems    Objective   /74   Pulse 91   SpO2 97%     Physical Exam  Vital signs noted alert and oriented x 3 NCAT no coryza nares without discharge OP benign erythema TM bilateral small erythema EACs clear no AC nodes no JVD chest clear to auscultation with bronchial breath sounds no wheezing CV regular rate and rhythm S1-S2 extremities no clubbing cyanosis or edema normal distal pulses  Assessment/Plan        Impression all diagnoses acute bacterial bronchitis with cough other diagnoses htn  Plan continue with other medications okay for prescription Zithromax 250 mg tablet take 2 tablets first day 1 breeches next 4 days #1 traditional Z-Александр and 0 refills increase water consumption's warm fluids Vicks topical if would like Tylenol for fever good hydration honey lemon cough drops for cough moisturization continue with other medications for bp with coreg watching diet for salt and recheck if no better and for regular visit  Dragon issue

## 2025-03-19 ENCOUNTER — APPOINTMENT (OUTPATIENT)
Dept: PRIMARY CARE | Facility: CLINIC | Age: 65
End: 2025-03-19
Payer: COMMERCIAL

## 2025-03-19 VITALS — HEART RATE: 70 BPM | OXYGEN SATURATION: 97 % | RESPIRATION RATE: 12 BRPM

## 2025-03-19 DIAGNOSIS — R73.02 GLUCOSE INTOLERANCE (IMPAIRED GLUCOSE TOLERANCE): Primary | ICD-10-CM

## 2025-03-19 DIAGNOSIS — R05.2 SUBACUTE COUGH: ICD-10-CM

## 2025-03-19 DIAGNOSIS — J40 BRONCHITIS: ICD-10-CM

## 2025-03-19 PROCEDURE — 99213 OFFICE O/P EST LOW 20 MIN: CPT | Performed by: INTERNAL MEDICINE

## 2025-03-19 NOTE — PROGRESS NOTES
Subjective   Patient ID: Moni Riggs is a 64 y.o. female who presents for Cough.    HPI follow-up visit no chest pain no shortness of breath had been coughing more recently improved she was concerned about the pneumonia that she had had in years past she did take the antibiotics and over-the-counter medicine and is feeling somewhat better    Review of Systems    Objective   There were no vitals taken for this visit.    Physical Exam vital signs noted alert and oriented x 3 NCAT no JVD chest clear to auscultation no wheezing no crackles no rub CV regular rate and rhythm S1-S2 extremities no clubbing cyanosis or edema normal distal pulses    Assessment/Plan        Impression resolving bronchitis with cough other diagnoses glucose intolerance/obesity  Plan review prior laboratory results the blood sugars have been elevated even prior to recent illness watch overall cho intake  Good diet regular exercise good water consumption she is working on her diet and exercise to effectively lose weight continue with Mucinex as needed then recheck for regular visit Endor if no better    Review labs noted bp and lipids and bs (check)

## 2025-05-07 ENCOUNTER — TELEPHONE (OUTPATIENT)
Dept: PRIMARY CARE | Facility: CLINIC | Age: 65
End: 2025-05-07

## 2025-06-11 ENCOUNTER — TELEPHONE (OUTPATIENT)
Dept: PRIMARY CARE | Facility: CLINIC | Age: 65
End: 2025-06-11
Payer: COMMERCIAL

## 2025-07-14 ENCOUNTER — APPOINTMENT (OUTPATIENT)
Dept: PRIMARY CARE | Facility: CLINIC | Age: 65
End: 2025-07-14
Payer: COMMERCIAL

## 2025-07-14 VITALS
HEART RATE: 67 BPM | RESPIRATION RATE: 12 BRPM | OXYGEN SATURATION: 97 % | DIASTOLIC BLOOD PRESSURE: 73 MMHG | SYSTOLIC BLOOD PRESSURE: 119 MMHG

## 2025-07-14 DIAGNOSIS — I10 PRIMARY HYPERTENSION: Primary | ICD-10-CM

## 2025-07-14 DIAGNOSIS — J30.1 ALLERGIC RHINITIS DUE TO POLLEN, UNSPECIFIED SEASONALITY: ICD-10-CM

## 2025-07-14 DIAGNOSIS — H10.89 OTHER CONJUNCTIVITIS, UNSPECIFIED LATERALITY: ICD-10-CM

## 2025-07-14 PROCEDURE — 3074F SYST BP LT 130 MM HG: CPT | Performed by: INTERNAL MEDICINE

## 2025-07-14 PROCEDURE — 3078F DIAST BP <80 MM HG: CPT | Performed by: INTERNAL MEDICINE

## 2025-07-14 PROCEDURE — 99213 OFFICE O/P EST LOW 20 MIN: CPT | Performed by: INTERNAL MEDICINE

## 2025-07-14 NOTE — PROGRESS NOTES
The Subjective   Patient ID: Moni Riggs is a 64 y.o. female who presents for No chief complaint on file..    HPI follow-up visit no chest pain no shortness of breath treated for allergic Ocular issues with drops seems to have cleared no fever otherwise feels okay no polyuria polydipsia    Review of Systems    Objective   There were no vitals taken for this visit.    Physical Exam vital signs noted alert and oriented x 3 NCAT no coryza nares heavy discharge OP benign TM EAC clear no AC nodes no JVD chest clear to auscultation CV regular rate and rhythm S1-S2 extremities no clubbing cyanosis or edema normal distal pulses    Assessment/Plan        Impression allergic rhinitis all diagnoses resolving conjunctivitis htn  Plan May continue with drops if helpful otherwise likely not needed may use antihistamine oral such as Zyrtec or Claritin diet weight loss exercise continue with bp medication and follow up cardiology  Review prior laboratory results noted increased blood sugars (check)  Reviewed prior imaging see below (Check)  Including those related to the lungs then recheck for regular visit and blood work (A1c?)    Review all for the blood work and the CT scan follow up in six months (Check)  RAC

## 2026-02-16 ENCOUNTER — APPOINTMENT (OUTPATIENT)
Dept: CARDIOLOGY | Facility: CLINIC | Age: 66
End: 2026-02-16
Payer: COMMERCIAL